# Patient Record
Sex: FEMALE | Race: BLACK OR AFRICAN AMERICAN | NOT HISPANIC OR LATINO | Employment: FULL TIME | URBAN - METROPOLITAN AREA
[De-identification: names, ages, dates, MRNs, and addresses within clinical notes are randomized per-mention and may not be internally consistent; named-entity substitution may affect disease eponyms.]

---

## 2017-11-01 ENCOUNTER — HISTORICAL (OUTPATIENT)
Dept: ADMINISTRATIVE | Facility: HOSPITAL | Age: 22
End: 2017-11-01

## 2017-11-01 LAB
ABS NEUT (OLG): 8.59 X10(3)/MCL (ref 2.1–9.2)
BASOPHILS # BLD AUTO: 0.06 X10(3)/MCL
BASOPHILS NFR BLD AUTO: 0 % (ref 0–1)
BILIRUB SERPL-MCNC: NEGATIVE MG/DL
BLOOD URINE, POC: NORMAL
BUN SERPL-MCNC: 7 MG/DL (ref 7–18)
CALCIUM SERPL-MCNC: 9.6 MG/DL (ref 8.5–10.1)
CHLORIDE SERPL-SCNC: 102 MMOL/L (ref 98–107)
CLARITY, POC UA: NORMAL
CO2 SERPL-SCNC: 26 MMOL/L (ref 21–32)
COLOR, POC UA: NORMAL
CREAT SERPL-MCNC: 0.7 MG/DL (ref 0.6–1.3)
EOSINOPHIL # BLD AUTO: 0.18 X10(3)/MCL
EOSINOPHIL NFR BLD AUTO: 2 % (ref 0–5)
ERYTHROCYTE [DISTWIDTH] IN BLOOD BY AUTOMATED COUNT: 12.6 % (ref 11.5–14.5)
GLUCOSE SERPL-MCNC: 79 MG/DL (ref 74–106)
GLUCOSE UR QL STRIP: NEGATIVE
HBV SURFACE AG SERPL QL IA: NEGATIVE
HCT VFR BLD AUTO: 34.9 % (ref 35–46)
HCV AB SERPL QL IA: NONREACTIVE
HGB BLD-MCNC: 12.4 GM/DL (ref 12–16)
HIV 1+2 AB+HIV1 P24 AG SERPL QL IA: NONREACTIVE
IMM GRANULOCYTES # BLD AUTO: 0.03 10*3/UL
IMM GRANULOCYTES NFR BLD AUTO: 0 %
LEUKOCYTE EST, POC UA: NORMAL
LYMPHOCYTES # BLD AUTO: 1.85 X10(3)/MCL
LYMPHOCYTES NFR BLD AUTO: 16 % (ref 15–40)
MCH RBC QN AUTO: 30 PG (ref 26–34)
MCHC RBC AUTO-ENTMCNC: 35.5 GM/DL (ref 31–37)
MCV RBC AUTO: 84.3 FL (ref 80–100)
MONOCYTES # BLD AUTO: 0.78 X10(3)/MCL
MONOCYTES NFR BLD AUTO: 7 % (ref 4–12)
NEUTROPHILS # BLD AUTO: 8.59 X10(3)/MCL
NEUTROPHILS NFR BLD AUTO: 75 X10(3)/MCL
NITRITE, POC UA: NEGATIVE
PH, POC UA: 5
PLATELET # BLD AUTO: 273 X10(3)/MCL (ref 130–400)
PMV BLD AUTO: 11.3 FL (ref 7.4–10.4)
POTASSIUM SERPL-SCNC: 3.9 MMOL/L (ref 3.5–5.1)
PROTEIN, POC: NORMAL
RBC # BLD AUTO: 4.14 X10(6)/MCL (ref 4–5.2)
SODIUM SERPL-SCNC: 137 MMOL/L (ref 136–145)
SPECIFIC GRAVITY, POC UA: 1.01
T PALLIDUM AB SER QL: NONREACTIVE
UROBILINOGEN, POC UA: NORMAL
WBC # SPEC AUTO: 11.5 X10(3)/MCL (ref 4.5–11)

## 2017-11-03 LAB — FINAL CULTURE: NO GROWTH

## 2017-11-29 LAB
BILIRUB SERPL-MCNC: NEGATIVE MG/DL
BLOOD URINE, POC: NORMAL
CLARITY, POC UA: NORMAL
COLOR, POC UA: NORMAL
GLUCOSE UR QL STRIP: NEGATIVE
KETONES UR QL STRIP: NORMAL
LEUKOCYTE EST, POC UA: NORMAL
NITRITE, POC UA: NEGATIVE
PH, POC UA: 6
PROTEIN, POC: NORMAL
SPECIFIC GRAVITY, POC UA: 1.02
UROBILINOGEN, POC UA: NORMAL

## 2018-02-19 LAB
BILIRUB SERPL-MCNC: NEGATIVE MG/DL
BLOOD URINE, POC: NEGATIVE
CLARITY, POC UA: CLEAR
COLOR, POC UA: YELLOW
GLUCOSE UR QL STRIP: NEGATIVE
KETONES UR QL STRIP: NEGATIVE
LEUKOCYTE EST, POC UA: NORMAL
NITRITE, POC UA: NEGATIVE
PH, POC UA: 6
PROTEIN, POC: NEGATIVE
SPECIFIC GRAVITY, POC UA: 1.01
UROBILINOGEN, POC UA: NORMAL

## 2018-03-23 ENCOUNTER — HISTORICAL (OUTPATIENT)
Dept: ADMINISTRATIVE | Facility: HOSPITAL | Age: 23
End: 2018-03-23

## 2018-03-23 LAB
BILIRUB SERPL-MCNC: NEGATIVE MG/DL
BLOOD URINE, POC: NEGATIVE
CLARITY, POC UA: CLEAR
COLOR, POC UA: YELLOW
ERYTHROCYTE [DISTWIDTH] IN BLOOD BY AUTOMATED COUNT: 12.5 % (ref 11.5–14.5)
GLUCOSE 1H P 100 G GLC PO SERPL-MCNC: 67 MG/DL
GLUCOSE UR QL STRIP: NEGATIVE
HCT VFR BLD AUTO: 32 % (ref 35–46)
HGB BLD-MCNC: 10.8 GM/DL (ref 12–16)
KETONES UR QL STRIP: NEGATIVE
LEUKOCYTE EST, POC UA: NEGATIVE
MCH RBC QN AUTO: 30 PG (ref 26–34)
MCHC RBC AUTO-ENTMCNC: 33.8 GM/DL (ref 31–37)
MCV RBC AUTO: 88.9 FL (ref 80–100)
NITRITE, POC UA: NEGATIVE
PH, POC UA: 6
PLATELET # BLD AUTO: 236 X10(3)/MCL (ref 130–400)
PMV BLD AUTO: 11.6 FL (ref 7.4–10.4)
PROTEIN, POC: NEGATIVE
RBC # BLD AUTO: 3.6 X10(6)/MCL (ref 4–5.2)
SPECIFIC GRAVITY, POC UA: 1.01
UROBILINOGEN, POC UA: NORMAL
WBC # SPEC AUTO: 12.5 X10(3)/MCL (ref 4.5–11)

## 2018-03-25 LAB — FINAL CULTURE: NO GROWTH

## 2018-04-20 LAB
BILIRUB SERPL-MCNC: NEGATIVE MG/DL
BLOOD URINE, POC: NEGATIVE
CLARITY, POC UA: CLEAR
COLOR, POC UA: YELLOW
GLUCOSE UR QL STRIP: NEGATIVE
KETONES UR QL STRIP: NEGATIVE
LEUKOCYTE EST, POC UA: NEGATIVE
NITRITE, POC UA: NEGATIVE
PH, POC UA: 6
PROTEIN, POC: NEGATIVE
SPECIFIC GRAVITY, POC UA: 1.01
UROBILINOGEN, POC UA: NORMAL

## 2018-05-10 ENCOUNTER — HISTORICAL (OUTPATIENT)
Dept: ADMINISTRATIVE | Facility: HOSPITAL | Age: 23
End: 2018-05-10

## 2018-05-10 LAB
BILIRUB SERPL-MCNC: NEGATIVE MG/DL
BLOOD URINE, POC: NEGATIVE
CLARITY, POC UA: CLEAR
COLOR, POC UA: YELLOW
GLUCOSE UR QL STRIP: NEGATIVE
KETONES UR QL STRIP: NEGATIVE
LEUKOCYTE EST, POC UA: NORMAL
NITRITE, POC UA: NEGATIVE
PH, POC UA: 7
PROTEIN, POC: NORMAL
SPECIFIC GRAVITY, POC UA: 1
UROBILINOGEN, POC UA: NORMAL

## 2018-05-12 LAB — FINAL CULTURE: NORMAL

## 2018-05-18 LAB
BILIRUB SERPL-MCNC: NEGATIVE MG/DL
BLOOD URINE, POC: NEGATIVE
CLARITY, POC UA: NORMAL
COLOR, POC UA: NORMAL
GLUCOSE UR QL STRIP: NEGATIVE
KETONES UR QL STRIP: NORMAL
LEUKOCYTE EST, POC UA: NORMAL
NITRITE, POC UA: NEGATIVE
PH, POC UA: 5
PROTEIN, POC: NORMAL
SPECIFIC GRAVITY, POC UA: 1.01
UROBILINOGEN, POC UA: NORMAL

## 2018-05-22 ENCOUNTER — HISTORICAL (OUTPATIENT)
Dept: LAB | Facility: HOSPITAL | Age: 23
End: 2018-05-22

## 2018-12-03 LAB — POC BETA-HCG (QUAL): NEGATIVE

## 2019-03-27 ENCOUNTER — HISTORICAL (OUTPATIENT)
Dept: ADMINISTRATIVE | Facility: HOSPITAL | Age: 24
End: 2019-03-27

## 2019-03-27 LAB
ALBUMIN SERPL-MCNC: 4 GM/DL (ref 3.4–5)
ALBUMIN/GLOB SERPL: 1 RATIO (ref 1.1–2)
ALP SERPL-CCNC: 89 UNIT/L (ref 45–117)
ALT SERPL-CCNC: 15 UNIT/L (ref 12–78)
AST SERPL-CCNC: 15 UNIT/L (ref 15–37)
BILIRUB SERPL-MCNC: 0.3 MG/DL (ref 0.2–1)
BILIRUBIN DIRECT+TOT PNL SERPL-MCNC: <0.1 MG/DL
BILIRUBIN DIRECT+TOT PNL SERPL-MCNC: ABNORMAL MG/DL
BUN SERPL-MCNC: 13 MG/DL (ref 7–18)
CALCIUM SERPL-MCNC: 8.9 MG/DL (ref 8.5–10.1)
CHLORIDE SERPL-SCNC: 108 MMOL/L (ref 98–107)
CO2 SERPL-SCNC: 26 MMOL/L (ref 21–32)
CREAT SERPL-MCNC: 0.7 MG/DL (ref 0.6–1.3)
ERYTHROCYTE [DISTWIDTH] IN BLOOD BY AUTOMATED COUNT: 13.2 % (ref 11.5–14.5)
GLOBULIN SER-MCNC: 4.1 GM/ML (ref 2.3–3.5)
GLUCOSE SERPL-MCNC: 74 MG/DL (ref 74–106)
HCT VFR BLD AUTO: 39.7 % (ref 35–46)
HGB BLD-MCNC: 12.8 GM/DL (ref 12–16)
MCH RBC QN AUTO: 28.1 PG (ref 26–34)
MCHC RBC AUTO-ENTMCNC: 32.2 GM/DL (ref 31–37)
MCV RBC AUTO: 87.3 FL (ref 80–100)
PLATELET # BLD AUTO: 251 X10(3)/MCL (ref 130–400)
PMV BLD AUTO: 12.1 FL (ref 7.4–10.4)
POTASSIUM SERPL-SCNC: 3.9 MMOL/L (ref 3.5–5.1)
PROT SERPL-MCNC: 8.1 GM/DL (ref 6.4–8.2)
RBC # BLD AUTO: 4.55 X10(6)/MCL (ref 4–5.2)
SODIUM SERPL-SCNC: 142 MMOL/L (ref 136–145)
WBC # SPEC AUTO: 7.2 X10(3)/MCL (ref 4.5–11)

## 2019-04-03 ENCOUNTER — HISTORICAL (OUTPATIENT)
Dept: SURGERY | Facility: HOSPITAL | Age: 24
End: 2019-04-03

## 2019-04-03 LAB — B-HCG SERPL QL: NEGATIVE

## 2022-04-10 ENCOUNTER — HISTORICAL (OUTPATIENT)
Dept: ADMINISTRATIVE | Facility: HOSPITAL | Age: 27
End: 2022-04-10
Payer: MEDICAID

## 2022-04-25 VITALS
WEIGHT: 134.5 LBS | HEIGHT: 62 IN | OXYGEN SATURATION: 99 % | SYSTOLIC BLOOD PRESSURE: 118 MMHG | DIASTOLIC BLOOD PRESSURE: 83 MMHG | BODY MASS INDEX: 24.75 KG/M2

## 2022-04-30 NOTE — PROGRESS NOTES
Health Maintenance     Pending (in the next year)        Due            Influenza Vaccine due  10/02/17  and every 1  year(s)           Alcohol Misuse Screening due  11/01/17  and every 1  year(s)           Depression Screening due  11/01/17  and every 1  year(s)           Tetanus Vaccine due  11/01/17  and every 10  year(s)     Satisfied (in the past 1 year)        Satisfied            Blood Pressure Screening on  11/01/17.  Satisfied by Tamia Mo RN           Body Mass Index Check on  11/01/17.  Satisfied by Tamia Mo RN           Obesity Screening on  11/01/17.  Satisfied by Tamia Mo RN

## 2022-04-30 NOTE — OP NOTE
DATE OF SURGERY:    1/29/18    SURGEON:  Francisco Santillan MD,      FIRST ASSIST: Danna Cheney MD    PREOPERATIVE DIAGNOSIS:  Bilateral Macromastia.    POSTOPERATIVE DIAGNOSIS:  Bilateral Macromastia.    PROCEDURE:  Bilateral breast reduction.    INDICATIONS FOR PROCEDURE:  This is a 23 year old female with a history of very large breasts.  These are extremely heavy and causing recurrent rashes and bra grooving as well as intertrigo.  She has had large breasts her whole life and this is inhibiting her quality of life and causing severe disability.  She presents for bilateral breast reduction.    ANESTHESIA:  General.    COMPLICATIONS:  None.    EBL: 100cc    SPECIMENS:  Right breast 422grams.  Left breast 460grams.    PROCEDURE IN DETAIL:  The patient was endotracheally intubated and prepped and draped in the usual sterile fashion.  We first turned our attention to the right breast.  We first marked out a 10 cm pedicle using a ruler and a marking pen.  This was an inferior type pedicle.  We then placed a breast tourniquet.  We used a 42 mm cookie cutter to outline an areolar incision.  Using a 10 blade scalpel we made an incision around the areolar and outlined the 10 cm pedicle.  The entire inferior pedicle was de-epithelialized using a 10 blade scalpel.  The breast tourniquet was then released and de-epithelization was completed using sharp curved Medina scissors.  We then used a 10 blade scalpel to outline the Cadena pattern skin resection.  We raised a superior flap using the Bovie cautery down to the level of the pectoralis major muscle.  A pocket was then created using the Bovie cautery staying superior to the pectoralis major all the way up to the clavicle.  After the superior flap was elevated we then performed our resection first medially and then centrally and then laterally using Bovie cautery.  The breast tissue was removed in its entirety and continuity.  After the breast was removed this was weighed.   This was 422grams.  After this was completed the entire operative bed was irrigated out with sterile saline solution.  Hemostasis was achieved using the Bovie cautery.  The inferior pedicle was the plicated using interrupted 0 Vicryl sutures.  After this was completed we then closed the superior flap over the inferior pedicle, closing the entirety of the Cadena pattern using a combination of Adson's and staplers.  This size and shape was excellent.  We then turned our attention to the left.      In a similar fashion an inferior pedicle was outlined which was 10 cm and completely de-epithelialized using a 10 blade scalpel after a 42 mm cookie cutter was used to cut out the nipple.  After this was completed we then raised superior flaps in an identical fashion first incising the skin using a 10 blade scalpel and raising the flap using Bovie cautery down to pectoralis major muscle.  A pocket was then created for the inferior pedicle.  We then removed the medial, superior, and lateral segments.  Total weight was 460grams.  After this was completed the entirety of the operative bed was irrigated out with sterile saline solution and Bovie cautery was used to obtain hemostasis.  Superior flaps were then closed over the inferior pedicle after the pedicle was plicated using interrupted 0 Vicryl sutures.  The Cadena pattern was closed using staples.  We then began to close.  We closed the deep tissues using interrupted 0 Vicryl pops.  We then closed the skin using a running 4-0 monofilament suture.  We then sat the patient up in a 90 degree position.  We marked out the nipples using 42 mm cookie cutters.  The nipples were cut out and brought up through superior flaps and the deep tissues were sewn into place using interrupted 3-0 Vicryl pops.  We then closed the areola using a running 4-0 monofilament suture.  The patient was then placed in a sterile dressing consisting of fluff and a surgical bra.  There were no complications.   I was scrubbed and present for the entire procedure.

## 2022-04-30 NOTE — PROGRESS NOTES
Patient:   Quinten Freitas            MRN: [id number]            FIN: [id number]               Age:   22 years     Sex:  Female     :  1995   Associated Diagnoses:   IUP (intrauterine pregnancy), incidental; Supervision of normal pregnancy in first trimester; GBS bacteriuria   Author:   Khanh Duron MD      Chief Complaint   23 yo  @ 8wk6d dated by 8wd U/S only (EDC 17)  10/4/17 early ultrasound in ED for pelvic pain showed likely gest sac; UPT ppos at that time      History of Present Illness     New Issues:  pink tinged d/c  intermittently <9 wks GA, no bleeding currently    Chronic Issues: Frequent UTIs      Histories   Gestational History:   - G1: Current   Gyn History:    - LMP: 17   - Age at menarche: 17 years   - Menstrual hx: regular, 3-5day cycles,    - History of birth control: N/A    - History of STDs and/or Abnormal PAPs: _    Past Medical History: Eczema  Surgical History: denies  Family History: denies thyroid disease in priomary relations  Social History: denies smoking, denies drug use, denies etoh  Medications:  PNvits      Past Medical History:    No active or resolved past medical history items have been selected or recorded.   Family History:    Entire family history is negative.   Procedure history:    No active procedure history items have been selected or recorded.   Social History        Social & Psychosocial Habits    Alcohol  2012 Risk Assessment: Denies Alcohol Use    2017  Use: Never    Employment/School  10/31/2017  Status: Employed, Student    Substance Abuse  2017  Use: Never    Tobacco  2012 Risk Assessment: Denies Tobacco Use    2017  Use: Never smoker  .        Health Status   Allergies:    Allergic Reactions (All)  No Known Allergies,    Allergies (1) Active Reaction  No Known Allergies None Documented     Current medications:  (Selected)   Prescriptions  Prescribed  Prenatal Plus oral tablet: 1 tab(s), Oral,  Daily, # 30 tab(s), 0 Refill(s), [id number]  doxylamine 25 mg oral tablet: 12.5 mg = 0.5 tab(s), Oral, TIDAC, 15 to 30 minutes before bed, X 30 day(s), # 45 tab(s), 1 Refill(s), [id number]  pyridoxine 25 mg oral tablet: 25 mg = 1 tab(s), Oral, TIDAC, X 30 day(s), # 90 tab(s), 2 Refill(s), [id number],    Home Medications (3) Active  doxylamine 25 mg oral tablet 12.5 mg = 0.5 tab(s), Oral, TIDAC  Prenatal Plus oral tablet 1 tab(s), Oral, Daily  pyridoxine 25 mg oral tablet 25 mg = 1 tab(s), Oral, TIDAC     Problem list:    All Problems  Encounter to determine fetal viability of pregnancy / SNOMED CT 58580060 / Confirmed  Hyperemesis gravidarum / SNOMED CT 98028709 / Confirmed  Problem added by Discern Expert  Pregnant / SNOMED CT 597392273 / Confirmed  Urinary tract infection in pregnancy / SNOMED CT 042280775 / Confirmed  Problem added by Discern Expert  Canceled: No Chronic Problems / Cerner NKP,    Active Problems (4)  Encounter to determine fetal viability of pregnancy   Hyperemesis gravidarum   Pregnant   Urinary tract infection in pregnancy         Review of Systems     Antepartum specific     denies: dysuira/ abnormal discharge /vag bleeding/ pelvic or abd pain /   reports: nause and vomiting , mild HA intermittently      Constitutional:  No fever, No chills.    Ear/Nose/Mouth/Throat:  Negative except as documented in history of present illness.    Respiratory:  No shortness of breath, No cough, No wheezing.    Cardiovascular:  No chest pain, No palpitations.    Gastrointestinal:  No nausea, No vomiting.    Genitourinary:  No dysuria, No lesions.    Gynecologic:  Negative except as documented in history of present illness.    Musculoskeletal:  No back pain, No joint pain, No muscle pain.    Integumentary:  No rash, No skin lesion.    Psychiatric:  No anxiety, No depression, Not suicidal.       Physical Examination   Vital Signs   11/1/2017 8:41 CDT        Temperature Oral          36.9 DegC                             Peripheral Pulse Rate     74 bpm                             Respiratory Rate          18 br/min                             SpO2                      100 %                             Saturation Probe Site     Hand, left                             Systolic Blood Pressure   111 mmHg                             Diastolic Blood Pressure  74 mmHg                             Mean Arterial Pressure, Cuff              86 mmHg                             Blood Pressure Location   Right arm                             Blood Pressure Cuff Size  Adult     Measurements from flowsheet : Measurements   11/1/2017 8:41 CDT       Weight Measured           58.05 kg                             Weighing Method           Standing                             Height/Length Dosing      157 cm                             Height/Length Measured    157 cm                             BSA Measured              1.59 m2                             Body Mass Index Measured  23.55 kg/m2                             Ideal Body Weight Calculated              49.665 kg     General:  Alert and oriented, No acute distress.    Eye:  Extraocular movements are intact, Normal conjunctiva.    HENT:  Normocephalic, Oral mucosa is moist.    Neck:  No thyromegaly.    Respiratory:  Lungs are clear to auscultation, Respirations are non-labored, Breath sounds are equal.    Cardiovascular:  Normal rate, Regular rhythm, Normal peripheral perfusion.    Gastrointestinal:  Soft, Non-tender, Normal bowel sounds, gravid.    Genitourinary:          Labia: no lesions.         Cervix: Mucosa ( No inflammation ), no blood per os, No lesions.    Obstetric Exam     Vagina: discharge (small amount, white), no lesions.     Musculoskeletal:  Normal strength, Normal gait.    Integumentary:  Warm, Dry, No rash.    Neurologic:  Alert, Normal deep tendon reflexes.    Cognition and Speech:  Oriented, Speech clear and  coherent.    Psychiatric:  Cooperative, Appropriate mood & affect, Normal judgment, Non-suicidal.       Health Maintenance      Health Maintenance     Pending (in the next year)        Due            Influenza Vaccine due  10/02/17  and every 1  year(s)           Alcohol Misuse Screening due  11/01/17  and every 1  year(s)           Depression Screening due  11/01/17  and every 1  year(s)           Tetanus Vaccine due  11/01/17  and every 10  year(s)     Satisfied (in the past 1 year)        Satisfied            Blood Pressure Screening on  11/01/17.  Satisfied by Tamia Mo RN.           Body Mass Index Check on  11/01/17.  Satisfied by Tamia Mo RN           Obesity Screening on  11/01/17.  Satisfied by Tamia Mo RN          Review / Management   Results review:  Lab results   11/1/2017 8:50 CDT       Urine Color Urine Dipstick                Dark yellow                             Urine Appearance Urine Dipstick           Slightly cloudy                             pH Urine Dipstick         5                             Specific Gravity Urine Dipstick           1.015                             Blood Urine Dipstick      Large                             Glucose Urine Dipstick    Negative                             Protein Urine Dipstick    Trace                             Bilirubin Urine Dipstick  Negative                             Urobilinogen Urine Dipstick               0.2 mg/dl                             Leukocytes Urine Dipstick Small                             Nitrite Urine Dipstick    Negative  .    Laboratory Results     Initial OB Labs(11/1/17): PENDING   - Blood Type and Rh: _   - Antibody Screen: _   - CBC H/H: _   - HIV: _   - RPR: _   - GC: _   - CT: _   - HBsAg: _   - HCVAb: _   - Rubella: _   - Varicella: _   - UA & Culture: _   - Sickle Cell Screen: _   - PAP: N/A (21 yo)     Influenza vaccine date:  NEEDS    15-20 Weeks Lab    - Quad Screen: _    28 Week Lab    - 1H  GTT: _    - Rhogam: _    - Date of Tdap: _    - CBC H/H: _    - RPR: _    36 Week Lab    - CBC H/H: _    - RPR: _    - GBS Culture: _    - HIV: _    - Urine: GC: _      Radiology results   Ultrasound(s):   - Initial U/S: 11/1/17: Comments: 1st trimester intrauterine pregnancy measuring CRL: 2.21cm; EGA: 8w6d; and EDC of 06/07/2018. (differs from LMP based EDC by 7 days)  FHR: 180. Viable pregnancy. ASSIGN EDC: 06/07/20 by this 8wk6d U/S ONLY            - 20wk Anatomy scan:      Documentation reviewed:          -POC Urine Dip: reviewed at time of interview .       Impression and Plan   Diagnosis     IUP (intrauterine pregnancy), incidental (RMU17-WQ Z33.1).     Supervision of normal pregnancy in first trimester (UQK49-GL Z34.91).     IUP (intrauterine pregnancy), incidental (JBY24-CI Z33.1).     GBS bacteriuria (RCI58-KG R82.71).     Orders     Orders (Selected)   Outpatient Orders  Ordered  [id number] U/S <14wk Sngl Fetus PC: 11/01/17 9:04:00 CDT  Routine Spec Collect Venipuncture - Lab blood collect: 11/01/17 11:06:00 CDT, [facility], Routine, 11/01/17 11:06:00 CDT  Wet Prep POC: 11/01/17 10:48:00 CDT, [facility]  Ordered (Collected)  Varicella Zoster Virus IgG Ab-[facility] [id number]: Routine collect, Blood, 11/01/17 11:06:00 CDT, Stop date 11/01/17 11:06:00 CDT, Nurse collect, Encounter to determine fetal viability of pregnancy  Encounter for supervision of normal first pregnancy, 11/01/17 11:06:00 CDT  Ordered (In-Lab)  ABSC Pre: Routine collect, 11/01/17 11:06:00 CDT, Blood, Stop date 11/01/17 11:06:00 CDT, Lab Collect, Encounter to determine fetal viability of pregnancy  Encounter for supervision of normal first pregnancy, 11/01/17 11:06:00 CDT  Automated Diff: Routine collect, 11/01/17 11:00:00 CDT, Blood, Collected, Stop date 11/01/17 11:00:00 CDT, Lab Collect, Encounter to determine fetal viability of pregnancy  Encounter for supervision of normal first pregnancy, 11/01/17 11:06:00 CDT  BMP: Routine collect,  11/01/17 11:06:00 CDT, Blood, Stop date 11/01/17 11:06:00 CDT, Lab Collect, Encounter to determine fetal viability of pregnancy  Encounter for supervision of normal first pregnancy, 11/01/17 11:06:00 CDT  CBC w/ Auto Diff: Routine collect, 11/01/17 11:06:00 CDT, Blood, Stop date 11/01/17 11:06:00 CDT, Lab Collect, Encounter to determine fetal viability of pregnancy  Encounter for supervision of normal first pregnancy, 11/01/17 11:06:00 CDT  Culture Urine: Routine collect, 11/01/17 11:06:00 CDT, Urine, Clean Catch, Nurse collect, Stop date 11/01/17 11:06:00 CDT, Encounter to determine fetal viability of pregnancy  Encounter for supervision of normal first pregnancy  HIV 1 and 2: Routine collect, 11/01/17 11:06:00 CDT, Blood, Stop date 11/01/17 11:06:00 CDT, Lab Collect, Encounter to determine fetal viability of pregnancy  Encounter for supervision of normal first pregnancy, 11/01/17 11:06:00 CDT  Hepatitis B Surface Antigen: Routine collect, 11/01/17 11:06:00 CDT, Blood, Stop date 11/01/17 11:06:00 CDT, Lab Collect, Encounter to determine fetal viability of pregnancy  Encounter for supervision of normal first pregnancy, 11/01/17 11:06:00 CDT  Hepatitis C Antibody: Routine collect, 11/01/17 11:06:00 CDT, Blood, Stop date 11/01/17 11:06:00 CDT, Lab Collect, Encounter to determine fetal viability of pregnancy  Encounter for supervision of normal first pregnancy, 11/01/17 11:06:00 CDT  Rubella Antibody IgG: Routine collect, 11/01/17 11:06:00 CDT, Blood, Stop date 11/01/17 11:06:00 CDT, Lab Collect, Encounter to determine fetal viability of pregnancy  Encounter for supervision of normal first pregnancy, 11/01/17 11:06:00 CDT  Sickle Cell Solubility Test: Routine collect, 11/01/17 11:06:00 CDT, Blood, Stop date 11/01/17 11:06:00 CDT, Lab Collect, Encounter to determine fetal viability of pregnancy  Encounter for supervision of normal first pregnancy, 11/01/17 11:06:00 CDT  Syphilis Antibody (with Reflex RPR):  Routine collect, 17 11:06:00 CDT, Blood, Once, Stop date 17 11:06:00 CDT, Lab Collect, Encounter to determine fetal viability of pregnancy  Encounter for supervision of normal first pregnancy, 17 11:06:00 CDT  Type and Rh Pre -auto: Routine collect, 17 11:06:00 CDT, Blood, Stop date 17 11:06:00 CDT, Lab Collect, Encounter to determine fetal viability of pregnancy  Encounter for supervision of normal first pregnancy, 17 11:06:00 CDT  Type and Rh: Routine collect, 17 11:06:00 CDT, Blood, Stop date 17 11:06:00 CDT, Lab Collect, Encounter to determine fetal viability of pregnancy  Encounter for supervision of normal first pregnancy, 17 11:06:00 CDT  [facility] Chlamydia trach & Neisseria gonorrhea PCR(urn & cerv only): Routine collect, Cervical, 17 11:06:00 CDT, Stop date 17 11:06:00 CDT, Nurse collect, Supervision of normal pregnancy in first trimester  Encounter to determine fetal viability of pregnancy  Future (On Hold)  Clinic Follow up: *Est. 17 3:00:00 CST, RTC 4 weeks assign to Continuity Resident, Order for future visit, Encounter to determine fetal viability of pregnancy  Encounter for supervision of normal first pregnancy, [facility]  Prescriptions  Prescribed  Prenatal Plus oral tablet: 1 tab(s), Oral, Daily, # 90 tab(s), 11 Refill(s), Pharmacy: [id number]  doxylamine 25 mg oral tablet: 12.5 mg = 0.5 tab(s), Oral, TIDAC, 15 to 30 minutes before bed, X 30 day(s), # 45 tab(s), 1 Refill(s), Pharmacy: [facility] #[id number]  pyridoxine 25 mg oral tablet: 25 mg = 1 tab(s), Oral, TIDAC, X 30 day(s), # 90 tab(s), 2 Refill(s), Pharmacy: [id number].       21 yo  @  dated by  U/S only (EDC 17)       1. Intrauterine Pregnancy    - OB Protocol     - PNVs perscribed 17    - 17 Urine dip: sm LE/ neg nitr/ tr prot     - f/u 17 UCx PENDING and document in Prenatal labs    - f/u  17(initial) labs: ordered/PENDING    - Mother plans on breast and/or bottlefeeds    - pregnancy diet / safe meds in pregnancy discussed    - precautioned regarding compliciations of first trimester    - Assign to PCP; RTC in 4wks    2. Nausea and vomitign complicating pregnancy  - fluid status normal, VSS stable  - B6 + Pyridoxine pescribed TID AC (17); Escribed  - strict ED precautions given for : uncontrolled nausea or vomiting/ dehydration/ unable to tolerate po    3. GBS carrier  - 17 2wk GA EmergDept UCx grew GBS in setting of 3 species  bactiuria (possible contam), No treatment per ED note  - F/u  UCx    DISPO: assigned to low risk PCP in 4 weeks   -NEEDS flu vacc   - f/u N/ Vom and complicance with B6+ Doxylamine   - f/u 17(initial) labs: ordered/PENDING   -anticipate  as birth plan currently      Rhonda NOVA

## 2022-04-30 NOTE — PROGRESS NOTES
Patient:   Quinten Freitas            MRN: [id number]            FIN: [id number]               Age:   22 years     Sex:  Female     :  1995   Associated Diagnoses:   IUP (intrauterine pregnancy), incidental   Author:   Indigo Ansari MD      History of Present Illness   22 year old  @ 33^1 WGA dated by 8wk6d U/S only (EDC 17) presents for OB follow up.    New Issues: No acute complaints for today.  Denies urinary urgency/frequency/dysuria.  Denies headache, vision change, gush of fluid, vaginal bleeding.    Chronic Issues: Sickle Cell Trait      Review of Systems   Contractions: [place]  Vaginal bleeding: No  Gush of fluid:No  Feels maybe move: Yes  Headache/vision change: No           Health Status   Allergies:    Allergies (1) Active Reaction  No Known Allergies None Documented     Current medications:    Home Medications (2) Active  Prenatal Multivitamin/Folic Acid 0.4 mg oral tablet ([facility] Substitution) 1 tab(s), Oral, Daily  Prenatal Multivitamin/Folic Acid 0.4 mg oral tablet ([facility] Substitution) 1 tab(s), Oral, Daily        Physical Examination   Vital Signs   2018 12:56 CDT      Temperature Oral          36.9 DegC                             Temperature Oral (calculated)             98.42 DegF                             Peripheral Pulse Rate     103 bpm  HI                             Respiratory Rate          18 br/min                             SpO2                      99 %                             Saturation Probe Site     Hand, right                             Systolic Blood Pressure   100 mmHg                             Diastolic Blood Pressure  67 mmHg                             Mean Arterial Pressure, Cuff              78 mmHg                             Blood Pressure Location   Right arm                             Blood Pressure Cuff Size  Adult     Measurements from flowsheet : Measurements   2018 12:56 CDT      Weight Measured           62.90 kg                              Weighing Method           Standing                             Height/Length Dosing      157 cm                             Height/Length Measured    157 cm                             BSA Measured              1.66 m2                             Body Mass Index Measured  25.52 kg/m2                             Ideal Body Weight Calculated              49.210108 kg     General:  Alert and oriented, No acute distress.    Eye:  Extraocular movements are intact, Normal conjunctiva.    HENT:  Oral mucosa is moist, No pharyngeal erythema.    Respiratory:  Lungs are clear to auscultation, Respirations are non-labored, Breath sounds are equal.    Cardiovascular:  Normal rate, Regular rhythm, No murmur.    Gastrointestinal:  Soft, Non-tender, Normal bowel sounds, gravid.    Genitourinary:  No costovertebral angle tenderness.    Obstetric Exam     Uterus: consistent with gestational age.     [place]/ Baby A fetal evaluation: heart tones (148 bpm, FH at 33cm).     Neurologic:  Alert, Oriented.    Psychiatric:  Cooperative, Appropriate mood & affect, Normal judgment, Non-suicidal.       Review / Management   Initial OB Labs:   - Blood Type and Rh: O POS   - Antibody Screen: negative   - CBC H/H: 12.1/34.6   - HIV: negative   - RPR: negative   - GC: negative   - CT: negative   - HBsAg: negative   - HCVAb: negative   - Rubella: immune   - Varicella: immune      - Sickle cell screen: positive (hx of sickle cell trait)   - UA & Culture: no final growth   - PAP: N/A (21 yo)     Influenza vaccine date: refuses, patient states she tested + for the flu at 22 WGA    15-20 Weeks Lab    - Quad Screen: missed    28 Week Lab    - 1H GTT: 67    - Rhogam: O+, not indicated    - Date of Tdap: 4/20/18    - CBC H/H: 10.8/32.0        36 Week Lab    - CBC H/H: _    - RPR: _    - GBS Culture: _    - HIV: _    - Urine: GC: _      Radiology results   Ultrasound(s):   - Initial U/S: 11/1/17: Comments: 1st trimester intrauterine  pregnancy measuring CRL: 2.21cm; EGA: 8w6d; and EDC of 2018. (differs from LMP based EDC by 7 days)  FHR: 180. Viable pregnancy. ASSIGN EDC: 20 by this 8wk6d U/S ONLY     - 20wk Anatomy scan: Comments: 2nd trimester interuterine pregnancy measuring 25w3d for EDC  of 2018 consistent with assigned date of 2018. Ratio calculations   within range for symmetrical development. EFW is 749.17gm [1lb 12oz] appropriate for gestational age at 41.2% {Rich}. FHR: 145; Gender: Female. No fetal anomalies noted         Impression and Plan   Diagnosis     IUP (intrauterine pregnancy), incidental ([id number] Z33.1).         1. Intrauterine Pregnancy    - Continue PNVs    - Urine dip as above    - Mother plans on breast and bottlefeeding    - Labor precautions given in detail     2. Hx of Sickle Cell Trait   - urine culture Q trimester           Disposition:  Anticipated

## 2022-04-30 NOTE — PROGRESS NOTES
Patient:   Quinten Freitas            MRN: [id number]            FIN: [id number]               Age:   22 years     Sex:  Female     :  1995   Associated Diagnoses:   None   Author:   Susanna NOVA, Luis KUMAR      Physician: SUSANNA  Reason for Exam:INITIAL PRENATAL VISIT; DATING US  :1  Parity:0  Gestational Age: 7w6d  EDC:     Examination:  IKE: ADEQUATE  Fetal Tone: PRESENT  Fetal Movement:   Fetal Heart Rate: 180  Fetus: SINGLE  Presentation:   Placenta:       Position:WRAP       Grade:     Measurement:     CRL: 2.21cm  EGA: 8w6d  EDC: 2018    Comments: 1st trimester intrauterine pregnancy measuring CRL: 2.21cm; EGA: 8w6d; and EDC of 2018.  FHR: 180. Viable pregnancy. ASSIGN EDC: 20          This document has images

## 2022-06-29 ENCOUNTER — LAB VISIT (OUTPATIENT)
Dept: LAB | Facility: HOSPITAL | Age: 27
End: 2022-06-29
Attending: OBSTETRICS & GYNECOLOGY
Payer: MEDICAID

## 2022-06-29 DIAGNOSIS — Z34.80 SUPERVISION OF OTHER NORMAL PREGNANCY: Primary | ICD-10-CM

## 2022-06-29 LAB
BASOPHILS # BLD AUTO: 0.03 X10(3)/MCL (ref 0–0.2)
BASOPHILS NFR BLD AUTO: 0.3 %
EOSINOPHIL # BLD AUTO: 0.08 X10(3)/MCL (ref 0–0.9)
EOSINOPHIL NFR BLD AUTO: 0.7 %
ERYTHROCYTE [DISTWIDTH] IN BLOOD BY AUTOMATED COUNT: 13.4 % (ref 11.5–17)
GLUCOSE 1H P 100 G GLC PO SERPL-MCNC: 130 MG/DL (ref 74–100)
GROUP & RH: NORMAL
HBV SURFACE AG SERPL QL IA: NONREACTIVE
HCT VFR BLD AUTO: 31.7 % (ref 37–47)
HCV AB SERPL QL IA: NONREACTIVE
HGB BLD-MCNC: 10.7 GM/DL (ref 12–16)
IMM GRANULOCYTES # BLD AUTO: 0.1 X10(3)/MCL (ref 0–0.02)
IMM GRANULOCYTES NFR BLD AUTO: 0.8 % (ref 0–0.43)
INDIRECT COOMBS GEL: NORMAL
LYMPHOCYTES # BLD AUTO: 1.51 X10(3)/MCL (ref 0.6–4.6)
LYMPHOCYTES NFR BLD AUTO: 12.6 %
MCH RBC QN AUTO: 28.5 PG (ref 27–31)
MCHC RBC AUTO-ENTMCNC: 33.8 MG/DL (ref 33–36)
MCV RBC AUTO: 84.5 FL (ref 80–94)
MONOCYTES # BLD AUTO: 0.54 X10(3)/MCL (ref 0.1–1.3)
MONOCYTES NFR BLD AUTO: 4.5 %
NEUTROPHILS # BLD AUTO: 9.7 X10(3)/MCL (ref 2.1–9.2)
NEUTROPHILS NFR BLD AUTO: 81.1 %
NRBC BLD AUTO-RTO: 0 %
PLATELET # BLD AUTO: 241 X10(3)/MCL (ref 130–400)
PMV BLD AUTO: 11.1 FL (ref 9.4–12.4)
RBC # BLD AUTO: 3.75 X10(6)/MCL (ref 4.2–5.4)
T PALLIDUM AB SER QL: NONREACTIVE
TSH SERPL-ACNC: 1.48 UIU/ML (ref 0.35–4.94)
WBC # SPEC AUTO: 12 X10(3)/MCL (ref 4.5–11.5)

## 2022-06-29 PROCEDURE — 85660 RBC SICKLE CELL TEST: CPT

## 2022-06-29 PROCEDURE — 36415 COLL VENOUS BLD VENIPUNCTURE: CPT

## 2022-06-29 PROCEDURE — 86803 HEPATITIS C AB TEST: CPT

## 2022-06-29 PROCEDURE — 82950 GLUCOSE TEST: CPT

## 2022-06-29 PROCEDURE — 87340 HEPATITIS B SURFACE AG IA: CPT

## 2022-06-29 PROCEDURE — 86850 RBC ANTIBODY SCREEN: CPT | Performed by: OBSTETRICS & GYNECOLOGY

## 2022-06-29 PROCEDURE — 87536 HIV-1 QUANT&REVRSE TRNSCRPJ: CPT

## 2022-06-29 PROCEDURE — 87088 URINE BACTERIA CULTURE: CPT

## 2022-06-29 PROCEDURE — 84443 ASSAY THYROID STIM HORMONE: CPT

## 2022-06-29 PROCEDURE — 85025 COMPLETE CBC W/AUTO DIFF WBC: CPT

## 2022-06-29 PROCEDURE — 86780 TREPONEMA PALLIDUM: CPT

## 2022-06-30 LAB — HGB S BLD QL SOLY: POSITIVE

## 2022-07-01 LAB
BACTERIA UR CULT: NORMAL
HIV 2 AB SERPLBLD QL IA.RAPID: NEGATIVE
HIV1 AB SERPLBLD QL IA.RAPID: NEGATIVE
HIV1 RNA # PLAS NAA DL=20: NORMAL COPIES/ML

## 2022-07-02 LAB
RUBV IGG SERPL IA-ACNC: 0.8
RUBV IGG SERPL QL IA: NORMAL

## 2022-08-02 ENCOUNTER — HOSPITAL ENCOUNTER (EMERGENCY)
Facility: HOSPITAL | Age: 27
Discharge: HOME | End: 2022-08-02
Payer: MEDICAID

## 2022-08-02 VITALS
DIASTOLIC BLOOD PRESSURE: 55 MMHG | HEART RATE: 119 BPM | SYSTOLIC BLOOD PRESSURE: 106 MMHG | WEIGHT: 157 LBS | OXYGEN SATURATION: 99 % | HEIGHT: 62 IN | TEMPERATURE: 98 F | BODY MASS INDEX: 28.89 KG/M2 | RESPIRATION RATE: 20 BRPM

## 2022-08-02 LAB
FLUAV AG UPPER RESP QL IA.RAPID: NOT DETECTED
FLUBV AG UPPER RESP QL IA.RAPID: NOT DETECTED
SARS-COV-2 RNA RESP QL NAA+PROBE: DETECTED

## 2022-08-02 PROCEDURE — 99284 EMERGENCY DEPT VISIT MOD MDM: CPT | Mod: 25

## 2022-08-02 PROCEDURE — 87636 SARSCOV2 & INF A&B AMP PRB: CPT | Performed by: SPECIALIST

## 2022-08-02 NOTE — DISCHARGE INSTRUCTIONS
Over the counter medications you can take for your symptoms include Delsym, Tylenol Cold and Flu, Benadryl, Tylenol, and Robitussin. Call primary OB for further questions about safe medications during pregnancy

## 2022-08-02 NOTE — ED PROVIDER NOTES
"       [facility] NOTE     Admit Date: 2022  [facility] Physician: Tristan Schwartz  Primary OBGYN: Dr. Jayesh France    Admit Diagnosis/Chief Complaint: Abdominal Pain, Nausea and Vomiting and Shortness of breath    Chief Complaint   Patient presents with    COVID-19 Concerns     IUP @ 32. 3 wks, c/o SOB, abdominal pain, nausea, dizziness, congestion, cough - recent covid exposure       Hospital Course:  Quinten Freitas is a 27 y.o.  at 32w3d presents complaining of 2 days of generalized aches and abdominal pain, nausea congestion and cough along with some shortness of breath.  Patient reports no fever at home.  She does report that she has been exposed to COVID-19 and did not receive the COVID vaccine.    This IUP is complicated by no complications reported.    Patient denies vaginal bleeding, leakage of fluid, contractions, headache, vision changes, RUQ pain, dysuria and fever.  Fetal Movement: normal.    Review of Systems    BP (!) 106/55   Pulse (!) 119   Temp 97.5 °F (36.4 °C) (Temporal)   Resp 20   Ht 5' 2" (1.575 m)   Wt 71.2 kg (157 lb)   LMP  (LMP Unknown)   SpO2 99%   Breastfeeding No   BMI 28.72 kg/m²   Temp:  [97.5 °F (36.4 °C)] 97.5 °F (36.4 °C)  Pulse:  [110-119] 119  Resp:  [20] 20  SpO2:  [99 %-100 %] 99 %  BP: ()/(55-61) 106/55    General: in no respiratory distress and acyanotic alert oriented times 3 afebrile  Cardiovascular: regular rate and rhythm no murmurs  Respiratory: clear to auscultation, no wheezes, rales or rhonchi, symmetric air entry  Abdominal: fundus soft, nontender 32 weeks size FHT present  Back: lumbar tenderness present CVA tenderness none  Extremeties no redness or tenderness in the calves or thighs    SSE:   SVE:  Deferred      FHT: Category 1  TOCO:  No contractions noted    Medical Decision Making:  COVID-19 and influenza test was performed.  COVID-19 test was positive.  The patient's oxygen saturation is 100%.  The patient was given instructions on necessary " isolation and follow-up.  Instructions for isolation precautions and mass precautions for any recent contacts were also discussed.  Patient was given instructions on symptomatic treatment and precautions of when to return to the hospital if worsening symptoms.    LABS:     Recent Results (from the past 24 hour(s))   COVID/FLU A&B PCR    Collection Time: 22 12:16 PM   Result Value Ref Range    Influenza A PCR Not Detected Not Detected    Influenza B PCR Not Detected Not Detected    SARS-CoV-2 PCR Detected (A) Not Detected     [unfilled]     Imaging Results    None          ASSESMENT: Quinten Freitas is a 27 y.o.   at 32w3d with symptoms occurring for COVID-19 infection.    Discharge Diagnosis:  Pregnancy 32 weeks.  COVID-19 infection.    Status:Stable    Disposition:  discharged to home    Medications:   Over-the-counter medications have been discussed for treatment of COVID symptoms..    Patient Instructions:   Current Discharge Medication List      CONTINUE these medications which have NOT CHANGED    Details   prenatal vit/iron fum/folic ac (PRENATAL 1+1 ORAL) Take by mouth.             - Pt was given routine pregnancy instructions including to return to triage if she had any vaginal bleeding (other than spotting for the next 48hrs), any loss of fluid like her water broke, decreased fetal movement, or contractions Q 5min lasting for 2 or more hours. Pt was also instructed to drink copious water.  She was instructed to return to the OB emergency department or follow-up with her primary OB in the case of extreme elevated temperatures or change in respiratory status.  Patient voiced understanding of all these instructions and was subsequently discharged home.    She will follow up with her primary OB.    No discharge procedures on file.    Tristan Schwartz MD  OB-GYN Hospitalist       Tristan Schwartz MD  [phone]

## 2022-08-02 NOTE — Clinical Note
"Quinten"Syeda Freitas was seen and treated in our emergency department on 8/2/2022.     COVID-19 is present in our communities across the state. There is limited testing for COVID at this time, so not all patients can be tested. In this situation, your employee meets the following criteria:    Quinten Freitas has met the criteria for COVID-19 testing and has a POSITIVE result. She can return to work once they are asymptomatic for 24 hours without the use of fever reducing medications AND at least five days from the first positive result. A mask is recommended for 5 days post quarantine.     If you have any questions or concerns, or if I can be of further assistance, please do not hesitate to contact me.    Sincerely,              RN"

## 2022-08-02 NOTE — ED TRIAGE NOTES
presents to to [facility] from ER. 32.3 wks, prev , c/o symptoms after recent covid exposure- producitve cough, sore throat, congestion, nausea, SOB, abdominal pain. EFM and TOCO applied to soft non tender abdomen, VS and maternal hx obtained. Pt denies any leaking of fluid, vaginal bleeding, ctx, reports positive fetal movement. MD notified of pt's arrival, maternal hx, chief complaints; orders for COVID swab- collected accordingly and sent to lab for testing. Will continue to monitor pt VS and baby.

## 2022-09-04 ENCOUNTER — HOSPITAL ENCOUNTER (EMERGENCY)
Facility: HOSPITAL | Age: 27
Discharge: HOME | End: 2022-09-04
Attending: OBSTETRICS & GYNECOLOGY
Payer: MEDICAID

## 2022-09-04 VITALS
TEMPERATURE: 99 F | OXYGEN SATURATION: 100 % | SYSTOLIC BLOOD PRESSURE: 122 MMHG | DIASTOLIC BLOOD PRESSURE: 72 MMHG | HEART RATE: 91 BPM

## 2022-09-04 PROBLEM — Z03.71 ENCOUNTER FOR SUSPECTED PROM, WITH RUPTURE OF MEMBRANES NOT FOUND: Status: ACTIVE | Noted: 2022-09-04

## 2022-09-04 LAB
CTP QC/QA: YES
RUPTURE OF MEMBRANE: NEGATIVE

## 2022-09-04 PROCEDURE — 99284 EMERGENCY DEPT VISIT MOD MDM: CPT | Mod: 25

## 2022-09-04 NOTE — Clinical Note
"Quinten"Syeda Freitas was seen and treated in our emergency department on 9/4/2022.  She may return to work on 09/06/2022.       If you have any questions or concerns, please don't hesitate to call.      Jadyn Kimbrough RN    "

## 2022-09-04 NOTE — ED PROVIDER NOTES
YARELY NOTE  [facility]     Admit Date: 2022  YARELY Physician: Sally Pak  Primary OBGYN: Dr. Jayesh France    Admit Diagnosis/Chief Complaint: Leakage of Fluid and Abdominal Pain  Discharge Diagnosis: Discomforts of Pregnancy and membranes not ruptured , dehydration    Chief Complaint   Patient presents with    Rupture of Membranes    Abdominal Pain     37.1 week iup  with c/o possible rom and abdominal cramping       Hospital Course:  Quinten Freitas is a 27 y.o.  at 37w1d presents complaining of LOF and abdominal pain.  Patient denies vaginal bleeding, headache, vision changes, RUQ pain, dysuria, fever, and nausea/vomiting.  Fetal Movement: normal.    /72   Pulse 91   Temp 98.5 °F (36.9 °C)   LMP  (LMP Unknown)   SpO2 100%   Breastfeeding No   Temp:  [98.5 °F (36.9 °C)] 98.5 °F (36.9 °C)  Pulse:  [91] 91  SpO2:  [100 %] 100 %  BP: (122)/(72) 122/72    General: in no apparent distress well developed and well nourished non-toxic in no respiratory distress and acyanotic alert oriented times 3 afebrile  Cardiovascular: regular rate and rhythm no murmurs  Respiratory: unlabored  Abdominal: soft, nontender, nondistended, no abnormal masses, no epigastric pain fundus soft, nontender 37 weeks size FHT present  Back: lumbar tenderness absent CVA tenderness none suprapubic tenderness absent  Extremeties no redness or tenderness in the calves or thighs no edema    SVE (PeriWATCH)  Dilation (cm): 4.5  Effacement (%): 65  Station: -1  Cervical Position: Posterior  Cervical Consistency: Soft  Examined by:: RASHIDA Nielson RN  Fernandes Score: 8  Simplified Fernandes Score: 6         EFM: Cat 1, 130 modBTV, +accel, no decel (reassuring, reactive)  TOCO: irritability      Medical Decision Making:    LABS:     Recent Results (from the past 24 hour(s))   POCT RUPTURE OF MEMBRANE    Collection Time: 22  6:54 PM   Result Value Ref Range    Rupture of Membrane Negative  Negative     Acceptable Yes        Imaging Results    None          ASSESMENT: Quinten Freitas is a 27 y.o.   at 37w1d with and dehydration    Discharge Diagnosis:   Patient Active Problem List   Diagnosis    Encounter for suspected PROM, with rupture of membranes not found       Status:Stable    Disposition:  discharged to home after rule out labor, cervix unchanged    Patient Instructions:   - Pt was given routine pregnancy instructions including to return to triage if she had any vaginal bleeding (other than spotting for the next 48hrs), any loss of fluid like her water broke, decreased fetal movement, or contractions Q 5min lasting for 2 or more hours. Pt was also instructed to drink copious water. Patient voiced understanding of all these instructions and was subsequently discharged home. Tylenol use and maternity belt use discussed. All questions answered. Pt left [facility] with good understanding of plan.   Preeclampsia/ROM/labor/fever/decreased FM with [facility] precautions discussed, voiced understanding     She will follow up with her primary OB as scheduled    Sally Pak MD  OB/GYN Hospitalist  6:39 PM 2022

## 2022-09-07 ENCOUNTER — HOSPITAL ENCOUNTER (INPATIENT)
Facility: HOSPITAL | Age: 27
LOS: 1 days | Discharge: HOME | End: 2022-09-08
Attending: OBSTETRICS & GYNECOLOGY | Admitting: OBSTETRICS & GYNECOLOGY
Payer: MEDICAID

## 2022-09-07 PROBLEM — Z03.71 ENCOUNTER FOR SUSPECTED PROM, WITH RUPTURE OF MEMBRANES NOT FOUND: Status: RESOLVED | Noted: 2022-09-04 | Resolved: 2022-09-07

## 2022-09-07 LAB
BASOPHILS # BLD AUTO: 0.06 X10(3)/MCL (ref 0–0.2)
BASOPHILS NFR BLD AUTO: 0.4 %
EOSINOPHIL # BLD AUTO: 0.12 X10(3)/MCL (ref 0–0.9)
EOSINOPHIL NFR BLD AUTO: 0.8 %
ERYTHROCYTE [DISTWIDTH] IN BLOOD BY AUTOMATED COUNT: 14.6 % (ref 11.5–17)
GROUP & RH: NORMAL
HBV SURFACE AG SERPL QL IA: NEGATIVE
HBV SURFACE AG SERPL QL IA: NONREACTIVE
HCT VFR BLD AUTO: 32.3 % (ref 37–47)
HGB BLD-MCNC: 10.5 GM/DL (ref 12–16)
HIV 1+2 AB+HIV1 P24 AG SERPL QL IA: NEGATIVE
IMM GRANULOCYTES # BLD AUTO: 0.14 X10(3)/MCL (ref 0–0.04)
IMM GRANULOCYTES NFR BLD AUTO: 0.9 %
INDIRECT COOMBS GEL: NORMAL
LYMPHOCYTES # BLD AUTO: 2.91 X10(3)/MCL (ref 0.6–4.6)
LYMPHOCYTES NFR BLD AUTO: 19.4 %
MCH RBC QN AUTO: 26.9 PG (ref 27–31)
MCHC RBC AUTO-ENTMCNC: 32.5 MG/DL (ref 33–36)
MCV RBC AUTO: 82.6 FL (ref 80–94)
MONOCYTES # BLD AUTO: 1.42 X10(3)/MCL (ref 0.1–1.3)
MONOCYTES NFR BLD AUTO: 9.5 %
NEUTROPHILS # BLD AUTO: 10.4 X10(3)/MCL (ref 2.1–9.2)
NEUTROPHILS NFR BLD AUTO: 69 %
NRBC BLD AUTO-RTO: 0 %
PLATELET # BLD AUTO: 259 X10(3)/MCL (ref 130–400)
PMV BLD AUTO: 11.8 FL (ref 7.4–10.4)
RBC # BLD AUTO: 3.91 X10(6)/MCL (ref 4.2–5.4)
RUBELLA IMMUNE STATUS: NORMAL
T PALLIDUM AB SER QL: NONREACTIVE
WBC # SPEC AUTO: 15 X10(3)/MCL (ref 4.5–11.5)

## 2022-09-07 PROCEDURE — 72200004 HC VAGINAL DELIVERY LEVEL I

## 2022-09-07 PROCEDURE — 99285 EMERGENCY DEPT VISIT HI MDM: CPT | Mod: 25

## 2022-09-07 PROCEDURE — 63600175 PHARM REV CODE 636 W HCPCS: Performed by: OBSTETRICS & GYNECOLOGY

## 2022-09-07 PROCEDURE — 63600175 PHARM REV CODE 636 W HCPCS

## 2022-09-07 PROCEDURE — 25000003 PHARM REV CODE 250: Performed by: OBSTETRICS & GYNECOLOGY

## 2022-09-07 PROCEDURE — 11000001 HC ACUTE MED/SURG PRIVATE ROOM

## 2022-09-07 PROCEDURE — 86850 RBC ANTIBODY SCREEN: CPT | Performed by: OBSTETRICS & GYNECOLOGY

## 2022-09-07 PROCEDURE — 86780 TREPONEMA PALLIDUM: CPT | Performed by: OBSTETRICS & GYNECOLOGY

## 2022-09-07 PROCEDURE — 72100002 HC LABOR CARE, 1ST 8 HOURS

## 2022-09-07 PROCEDURE — 36415 COLL VENOUS BLD VENIPUNCTURE: CPT | Performed by: OBSTETRICS & GYNECOLOGY

## 2022-09-07 PROCEDURE — 85025 COMPLETE CBC W/AUTO DIFF WBC: CPT | Performed by: OBSTETRICS & GYNECOLOGY

## 2022-09-07 PROCEDURE — 87340 HEPATITIS B SURFACE AG IA: CPT | Performed by: OBSTETRICS & GYNECOLOGY

## 2022-09-07 RX ORDER — PROCHLORPERAZINE EDISYLATE 5 MG/ML
5 INJECTION INTRAMUSCULAR; INTRAVENOUS EVERY 6 HOURS PRN
Status: DISCONTINUED | OUTPATIENT
Start: 2022-09-07 | End: 2022-09-08 | Stop reason: HOSPADM

## 2022-09-07 RX ORDER — OXYCODONE AND ACETAMINOPHEN 5; 325 MG/1; MG/1
1 TABLET ORAL EVERY 6 HOURS PRN
Status: DISCONTINUED | OUTPATIENT
Start: 2022-09-07 | End: 2022-09-08 | Stop reason: HOSPADM

## 2022-09-07 RX ORDER — OXYTOCIN/RINGER'S LACTATE 30/500 ML
95 PLASTIC BAG, INJECTION (ML) INTRAVENOUS ONCE
Status: COMPLETED | OUTPATIENT
Start: 2022-09-07 | End: 2022-09-07

## 2022-09-07 RX ORDER — DIPHENHYDRAMINE HCL 25 MG
25 CAPSULE ORAL EVERY 4 HOURS PRN
Status: DISCONTINUED | OUTPATIENT
Start: 2022-09-07 | End: 2022-09-08 | Stop reason: HOSPADM

## 2022-09-07 RX ORDER — OXYTOCIN/RINGER'S LACTATE 30/500 ML
334 PLASTIC BAG, INJECTION (ML) INTRAVENOUS ONCE
Status: DISCONTINUED | OUTPATIENT
Start: 2022-09-07 | End: 2022-09-08 | Stop reason: HOSPADM

## 2022-09-07 RX ORDER — DOCUSATE SODIUM 100 MG/1
200 CAPSULE, LIQUID FILLED ORAL 2 TIMES DAILY PRN
Status: DISCONTINUED | OUTPATIENT
Start: 2022-09-07 | End: 2022-09-08 | Stop reason: HOSPADM

## 2022-09-07 RX ORDER — IBUPROFEN 600 MG/1
600 TABLET ORAL EVERY 6 HOURS PRN
Status: DISCONTINUED | OUTPATIENT
Start: 2022-09-07 | End: 2022-09-08 | Stop reason: HOSPADM

## 2022-09-07 RX ORDER — SIMETHICONE 80 MG
1 TABLET,CHEWABLE ORAL 4 TIMES DAILY PRN
Status: DISCONTINUED | OUTPATIENT
Start: 2022-09-07 | End: 2022-09-08 | Stop reason: HOSPADM

## 2022-09-07 RX ORDER — HYDROCORTISONE 25 MG/G
CREAM TOPICAL 3 TIMES DAILY PRN
Status: DISCONTINUED | OUTPATIENT
Start: 2022-09-07 | End: 2022-09-08 | Stop reason: HOSPADM

## 2022-09-07 RX ORDER — SODIUM CHLORIDE 0.9 % (FLUSH) 0.9 %
10 SYRINGE (ML) INJECTION
Status: CANCELLED | OUTPATIENT
Start: 2022-09-07

## 2022-09-07 RX ORDER — DIPHENOXYLATE HYDROCHLORIDE AND ATROPINE SULFATE 2.5; .025 MG/1; MG/1
1 TABLET ORAL 4 TIMES DAILY PRN
Status: DISCONTINUED | OUTPATIENT
Start: 2022-09-07 | End: 2022-09-08 | Stop reason: HOSPADM

## 2022-09-07 RX ORDER — OXYCODONE AND ACETAMINOPHEN 10; 325 MG/1; MG/1
1 TABLET ORAL EVERY 4 HOURS PRN
Status: DISCONTINUED | OUTPATIENT
Start: 2022-09-07 | End: 2022-09-08 | Stop reason: HOSPADM

## 2022-09-07 RX ORDER — BUTORPHANOL TARTRATE 2 MG/ML
INJECTION INTRAMUSCULAR; INTRAVENOUS
Status: COMPLETED
Start: 2022-09-07 | End: 2022-09-07

## 2022-09-07 RX ORDER — MISOPROSTOL 100 UG/1
800 TABLET ORAL
Status: DISCONTINUED | OUTPATIENT
Start: 2022-09-07 | End: 2022-09-08 | Stop reason: HOSPADM

## 2022-09-07 RX ORDER — PRENATAL WITH FERROUS FUM AND FOLIC ACID 3080; 920; 120; 400; 22; 1.84; 3; 20; 10; 1; 12; 200; 27; 25; 2 [IU]/1; [IU]/1; MG/1; [IU]/1; MG/1; MG/1; MG/1; MG/1; MG/1; MG/1; UG/1; MG/1; MG/1; MG/1; MG/1
1 TABLET ORAL DAILY
Status: DISCONTINUED | OUTPATIENT
Start: 2022-09-07 | End: 2022-09-08 | Stop reason: HOSPADM

## 2022-09-07 RX ORDER — CALCIUM CARBONATE 200(500)MG
500 TABLET,CHEWABLE ORAL 3 TIMES DAILY PRN
Status: DISCONTINUED | OUTPATIENT
Start: 2022-09-07 | End: 2022-09-08 | Stop reason: HOSPADM

## 2022-09-07 RX ORDER — LIDOCAINE HYDROCHLORIDE 10 MG/ML
10 INJECTION INFILTRATION; PERINEURAL ONCE AS NEEDED
Status: DISCONTINUED | OUTPATIENT
Start: 2022-09-07 | End: 2022-09-08 | Stop reason: HOSPADM

## 2022-09-07 RX ORDER — ACETAMINOPHEN 325 MG/1
650 TABLET ORAL EVERY 6 HOURS PRN
Status: DISCONTINUED | OUTPATIENT
Start: 2022-09-07 | End: 2022-09-08 | Stop reason: HOSPADM

## 2022-09-07 RX ORDER — DIPHENHYDRAMINE HYDROCHLORIDE 50 MG/ML
25 INJECTION INTRAMUSCULAR; INTRAVENOUS EVERY 4 HOURS PRN
Status: DISCONTINUED | OUTPATIENT
Start: 2022-09-07 | End: 2022-09-08 | Stop reason: HOSPADM

## 2022-09-07 RX ORDER — MUPIROCIN 20 MG/G
OINTMENT TOPICAL
Status: DISCONTINUED | OUTPATIENT
Start: 2022-09-07 | End: 2022-09-08 | Stop reason: HOSPADM

## 2022-09-07 RX ORDER — HYDROCODONE BITARTRATE AND ACETAMINOPHEN 5; 325 MG/1; MG/1
1 TABLET ORAL EVERY 4 HOURS PRN
Status: DISCONTINUED | OUTPATIENT
Start: 2022-09-07 | End: 2022-09-08 | Stop reason: HOSPADM

## 2022-09-07 RX ORDER — METHYLERGONOVINE MALEATE 0.2 MG/ML
200 INJECTION INTRAVENOUS
Status: DISCONTINUED | OUTPATIENT
Start: 2022-09-07 | End: 2022-09-08 | Stop reason: HOSPADM

## 2022-09-07 RX ORDER — CARBOPROST TROMETHAMINE 250 UG/ML
250 INJECTION, SOLUTION INTRAMUSCULAR
Status: DISCONTINUED | OUTPATIENT
Start: 2022-09-07 | End: 2022-09-08 | Stop reason: HOSPADM

## 2022-09-07 RX ORDER — ONDANSETRON 4 MG/1
8 TABLET, ORALLY DISINTEGRATING ORAL EVERY 8 HOURS PRN
Status: DISCONTINUED | OUTPATIENT
Start: 2022-09-07 | End: 2022-09-08 | Stop reason: HOSPADM

## 2022-09-07 RX ORDER — OXYTOCIN/RINGER'S LACTATE 30/500 ML
PLASTIC BAG, INJECTION (ML) INTRAVENOUS
Status: DISPENSED
Start: 2022-09-07 | End: 2022-09-07

## 2022-09-07 RX ORDER — ONDANSETRON 4 MG/1
8 TABLET, ORALLY DISINTEGRATING ORAL EVERY 8 HOURS PRN
Status: CANCELLED | OUTPATIENT
Start: 2022-09-07

## 2022-09-07 RX ORDER — PROCHLORPERAZINE EDISYLATE 5 MG/ML
5 INJECTION INTRAMUSCULAR; INTRAVENOUS EVERY 6 HOURS PRN
Status: CANCELLED | OUTPATIENT
Start: 2022-09-07

## 2022-09-07 RX ORDER — SIMETHICONE 80 MG
1 TABLET,CHEWABLE ORAL EVERY 6 HOURS PRN
Status: DISCONTINUED | OUTPATIENT
Start: 2022-09-07 | End: 2022-09-08 | Stop reason: HOSPADM

## 2022-09-07 RX ORDER — SODIUM CHLORIDE, SODIUM LACTATE, POTASSIUM CHLORIDE, CALCIUM CHLORIDE 600; 310; 30; 20 MG/100ML; MG/100ML; MG/100ML; MG/100ML
INJECTION, SOLUTION INTRAVENOUS CONTINUOUS
Status: DISCONTINUED | OUTPATIENT
Start: 2022-09-07 | End: 2022-09-08 | Stop reason: HOSPADM

## 2022-09-07 RX ADMIN — PRENATAL VITAMINS-IRON FUMARATE 27 MG IRON-FOLIC ACID 0.8 MG TABLET 1 TABLET: at 10:09

## 2022-09-07 RX ADMIN — OXYCODONE HYDROCHLORIDE AND ACETAMINOPHEN 1 TABLET: 5; 325 TABLET ORAL at 06:09

## 2022-09-07 RX ADMIN — Medication 95 MILLI-UNITS/MIN: at 03:09

## 2022-09-07 RX ADMIN — IBUPROFEN 600 MG: 600 TABLET ORAL at 03:09

## 2022-09-07 RX ADMIN — Medication 95 MILLI-UNITS/MIN: at 05:09

## 2022-09-07 RX ADMIN — OXYCODONE AND ACETAMINOPHEN 1 TABLET: 10; 325 TABLET ORAL at 05:09

## 2022-09-07 RX ADMIN — IBUPROFEN 600 MG: 600 TABLET ORAL at 10:09

## 2022-09-07 RX ADMIN — BENZOCAINE AND LEVOMENTHOL: 200; 5 SPRAY TOPICAL at 04:09

## 2022-09-07 RX ADMIN — BUTORPHANOL TARTRATE 2 MG: 2 INJECTION, SOLUTION INTRAMUSCULAR; INTRAVENOUS at 03:09

## 2022-09-07 RX ADMIN — SODIUM CHLORIDE, POTASSIUM CHLORIDE, SODIUM LACTATE AND CALCIUM CHLORIDE: 600; 310; 30; 20 INJECTION, SOLUTION INTRAVENOUS at 03:09

## 2022-09-07 RX ADMIN — IBUPROFEN 600 MG: 600 TABLET ORAL at 04:09

## 2022-09-07 NOTE — ED PROVIDER NOTES
YARELY NOTE  [facility]     Admit Date: 2022  YARELY Physician: Sally Pak  Primary OBGYN: Dr. Jayesh France    Admit Diagnosis/Chief Complaint:  second stage labor      Hospital Course:  Quinten Freitas is a 27 y.o.  at 37w4d presents complaining of ctx pain.  No LOF or VB. +FM       LMP  (LMP Unknown)   Breastfeeding Unknown        General: in no apparent distress well developed and well nourished non-toxic in no respiratory distress and acyanotic alert oriented times 3  Cardiovascular: regular rate and rhythm no murmurs  Respiratory: unlabored  Abdominal: soft, nontender, nondistended, no abnormal masses, no epigastric pain fundus soft, nontender 37 weeks size FHT present  Back: lumbar tenderness absent CVA tenderness none suprapubic tenderness absent  Extremeties no redness or tenderness in the calves or thighs no edema    SVE (PeriWATCH)  Dilation (cm): 10  Effacement (%): 100  Station: -1  Examined by:: aleja szymanski  Simplified Fernandes Score: 8         EFM: Cat 1, 145 modBTV, +accel, no decel (reassuring, reactive)  TOCO:  ctx q4 mins      Medical Decision Making:      LABS:   No results found for this or any previous visit (from the past 24 hour(s)).    Imaging Results    None          ASSESMENT: Quinten Freitas is a 27 y.o.   at 37w4d with     Discharge Diagnosis:   Patient Active Problem List   Diagnosis    Precipitous delivery     See delivery note for details    Sally Pak MD  OB/GYN Hospitalist  3:23 AM 2022

## 2022-09-07 NOTE — L&D DELIVERY NOTE
[facility] - Labor and Delivery  OBGYN  Operative Note    SUMMARY     Date of Procedure: 2022    Procedure: Spontaneous Vaginal Delivery    Surgeon: Sally Pak MD OB Hospitalist    Pre-Operative Diagnosis:   Patient Active Problem List   Diagnosis    Precipitous delivery       Post-Operative Diagnosis:   Same with addition of  Single liveborn infant now s/p  37w4d without complications  Marginal placenta      Anesthesia: none    Procedure in Detail: With good maternal effort a viable liveborn infant was delivered as the fetal head delivered in direct OA position after AROM at 2+ station.  Nuchal cord was present x2, Remainder of infant delivered without difficulty with right shoulder anterior followed by remaining body.    placed on maternal abdomen and bulb suctioning performed. 3VC was cut and clamped after 30 sec delay and cord blood obtained. The vagina, perineum, and cervix were examined. The placenta then delivered spontaneously and was noted to be intact. Lacerations were not present. After any necessary repairs were performed, all instruments and sponges were removed from the vagina. Sponge, lap, and needle counts were correct after the procedure.    Repair Suture: None    Infant: Liveborn male infant with APGARS of 8/9; 3 vessel umbilical cord. Moving both upper extremities well. Weight of baby not yet obtained at time of this note.   [facility] team present at bedside.    Complications: No    Estimated Blood Loss (EBL): 150 cc           Condition: Mother and baby bonding well    A qualified resident was not available to assist.    Sally Pak MD  OB/GYN Hospitalist  :29 AM

## 2022-09-07 NOTE — H&P
HISTORY AND PHYSICAL                                                OBSTETRICS          Subjective:      Quinten rFeitas is a 27 y.o.  female with IUP at 37w4d weeks gestation who presents to L&D for 2nd stage labor with painful ctx. No LOF or VB. +FM    PMHx: No past medical history on file.    PSHx: No past surgical history on file.    All: Review of patient's allergies indicates:  No Known Allergies    Meds:   Medications Prior to Admission   Medication Sig Dispense Refill Last Dose    prenatal vit/iron fum/folic ac (PRENATAL 1+1 ORAL) Take by mouth.          SH:   Social History     Socioeconomic History    Marital status: Single   Tobacco Use    Smoking status: Never    Smokeless tobacco: Never   Substance and Sexual Activity    Alcohol use: Not Currently    Drug use: Never    Sexual activity: Yes       FH: No family history on file.    OBHx:   OB History    Para Term  AB Living   2 1 1 0 0 1   SAB IAB Ectopic Multiple Live Births   0 0 0 0 1      # Outcome Date GA Lbr Jose/2nd Weight Sex Delivery Anes PTL Lv   2             1 Term 20    F Vag-Spont EPI  ZAYNAB       Objective:      LMP  (LMP Unknown)   Breastfeeding Unknown   There is no height or weight on file to calculate BMI.    General:   alert and cooperative   HEENT:  normocephalic, atraumatic   Lungs:   clear to auscultation bilaterally   Heart:   regular rate and rhythm, S1, S2 normal   Abdomen:  gravid, non-tender   Extremities non-tender, no edema   Derm: no rashes or lesions   Psych: appropriate mood and affect   Pelvis:  adequate       EFM: Cat 1, 145 modBTV, +accel, no decel (reassuring, reactive)  TOCO:  ctx q4 mins    SVE (PeriWATCH)  Dilation (cm): 6.5  Effacement (%): 80  Station: -1  Examined by:: aleja szymanski  Simplified Fernandes Score: 8     Lab Review  Blood Type O POS  Hep B neg  RNI  RPR neg  HIV neg     Lab Results   Component Value Date    WBC 12.0 (H) 2022    HGB 10.7 (L) 2022    HCT 31.7  (L) 2022    MCV 84.5 2022     2022           Assessment:     27 y.o.  at 37w4d weeks gestation.  2nd stage labor and progressed quickly to    Active Hospital Problems    Diagnosis  POA    *Precipitous delivery [O62.3]  No      Resolved Hospital Problems   No resolved problems to display.          Plan:     1. Risks, benefits, alternatives and possible complications have been discussed in detail with the patient. All questions have been answered, and Ms. Freitas has voiced understanding and agrees to the treatment plan.  2. Consents signed and in chart  3. Admit to Labor and Delivery unit  4. Patient progressed to  see notes for details     Sally Pak MD  OB/GYN Hospitalist  3:20 AM 2022

## 2022-09-07 NOTE — H&P
[facility]                       [address] [address]    PATIENT NAME:       ERIC BAEZ  YOB: 1995  CSN:                [id number]   MRN:                [id number]  ADMIT DATE:         2022 02:16:00  PHYSICIAN:          Jayesh France Jr, MD                        HISTORY AND PHYSICAL      Patient is a 27-year-old  2, para 1, with pregnancy at 37 weeks 4 days,   presents to the obstetric unit complaining of contractions.  Patient noted to be   in active labor, regular contractions.  She was admitted for delivery.  She has   had prenatal care since early in pregnancy.  Pregnancy has been uneventful.    Refer to the OB flow sheet for history.  Vitals were stable.  She was afebrile.    PHYSICAL EXAMINATION:  Within normal limits.    ASSESSMENT:  Pregnancy at 37 and 4 weeks, active labor with advanced cervical   dilation.    PLAN:  Admit for delivery.        ______________________________  Jayesh France Jr, MD    DJE/AQS  DD:  2022  Time:  05:41AM  DT:  2022  Time:  07:42AM  Job #:  [id number]      HISTORY AND PHYSICAL

## 2022-09-07 NOTE — OP NOTE
[facility]                       [address] [address]    PATIENT NAME:      EIRC BAEZ  YOB: 1995  CSN:               [id number]  MRN:               [id number]  ADMIT DATE:        09/07/2022 02:16:00  PHYSICIAN:         Jayesh France Jr, MD                          OPERATIVE REPORT      DATE OF SURGERY:        SURGEON:  Jayesh France Jr, MD    Patient presented to the OB ED in active labor and subsequently underwent a   precipitous vaginal delivery that was attended by Dr. Pak.  The infant was   delivered without incident.  The placenta was delivered spontaneously.  IV   Pitocin drip was begun.  Uterus was massaged, noted to be firm at the umbilicus.    Vaginal and cervical inspection revealed no lacerations or tears.  Apgars,   weight pending.  Blood loss 350.        ______________________________  Jayesh France Jr, MD    DJE/AQS  DD:  09/07/2022  Time:  05:42AM  DT:  09/07/2022  Time:  07:54AM  Job #:  [id number]      OPERATIVE REPORT

## 2022-09-07 NOTE — PLAN OF CARE
"  Problem: Adult Inpatient Plan of Care  Goal: Plan of Care Review  Outcome: Ongoing, Progressing  Flowsheets (Taken 9/7/2022 0544)  Plan of Care Reviewed With:   patient   significant other  Goal: Patient-Specific Goal (Individualized)  Outcome: Ongoing, Progressing  Flowsheets (Taken 9/7/2022 0544)  Patient-Specific Goals (Include Timeframe): "I want to get some rest."  Goal: Absence of Hospital-Acquired Illness or Injury  Outcome: Ongoing, Progressing  Intervention: Prevent Infection  Flowsheets (Taken 9/7/2022 0544)  Infection Prevention: hand hygiene promoted  Goal: Optimal Comfort and Wellbeing  Outcome: Ongoing, Progressing  Goal: Readiness for Transition of Care  Outcome: Ongoing, Progressing     Problem: Adjustment to Role Transition (Postpartum Vaginal Delivery)  Goal: Successful Maternal Role Transition  Outcome: Ongoing, Progressing     Problem: Bleeding (Postpartum Vaginal Delivery)  Goal: Hemostasis  Outcome: Ongoing, Progressing     Problem: Infection (Postpartum Vaginal Delivery)  Goal: Absence of Infection Signs/Symptoms  Outcome: Ongoing, Progressing     Problem: Pain (Postpartum Vaginal Delivery)  Goal: Acceptable Pain Control  Outcome: Ongoing, Progressing     Problem: Urinary Retention (Postpartum Vaginal Delivery)  Goal: Effective Urinary Elimination  Outcome: Ongoing, Progressing     "

## 2022-09-08 VITALS
TEMPERATURE: 98 F | DIASTOLIC BLOOD PRESSURE: 71 MMHG | SYSTOLIC BLOOD PRESSURE: 114 MMHG | HEART RATE: 96 BPM | RESPIRATION RATE: 14 BRPM

## 2022-09-08 PROCEDURE — 25000003 PHARM REV CODE 250: Performed by: OBSTETRICS & GYNECOLOGY

## 2022-09-08 RX ORDER — HYDROCODONE BITARTRATE AND ACETAMINOPHEN 5; 325 MG/1; MG/1
1 TABLET ORAL EVERY 6 HOURS PRN
Qty: 28 TABLET | Refills: 0 | Status: SHIPPED | OUTPATIENT
Start: 2022-09-08

## 2022-09-08 RX ADMIN — DOCUSATE SODIUM 200 MG: 100 CAPSULE, LIQUID FILLED ORAL at 07:09

## 2022-09-08 RX ADMIN — OXYCODONE AND ACETAMINOPHEN 1 TABLET: 10; 325 TABLET ORAL at 12:09

## 2022-09-08 RX ADMIN — IBUPROFEN 600 MG: 600 TABLET ORAL at 04:09

## 2022-09-08 NOTE — DISCHARGE SUMMARY
[facility]                       [address] [address]    PATIENT NAME:       ERIC BAEZ  YOB: 1995  CSN:                [id number]   MRN:                [id number]  ADMIT DATE:         09/07/2022 02:16:00  PHYSICIAN:          Jayesh France Jr, MD                          DISCHARGE SUMMARY    DATE OF DISCHARGE:      HOSPITAL COURSE:  Patient is status post vaginal delivery without incident.  She   was admitted to the postpartal GYN service, where she had an uneventful and   speedy recovery.  She was ambulatory, tolerating a regular diet, her vitals   stable, she is afebrile.  She has had normal bowel and bladder function.  She   will be discharged home on postpartum day 2.    CONDITION ON DISCHARGE:  Stable.    DIET:  Regular.    ACTIVITY:  Pelvic rest.    MEDICATIONS:    1. Percocet p.r.n.   2. Motrin p.r.n.    FOLLOWUP:  With Román in 3 weeks.        ______________________________  Jayesh France Jr, MD    DJE/AQS  DD:  09/08/2022  Time:  07:13AM  DT:  09/08/2022  Time:  07:59AM  Job #:  [id number]      DISCHARGE SUMMARY

## 2022-09-08 NOTE — PLAN OF CARE
Problem:  Fall Injury Risk  Goal: Absence of Fall, Infant Drop and Related Injury  Outcome: Ongoing, Progressing     Problem: Adult Inpatient Plan of Care  Goal: Plan of Care Review  Outcome: Ongoing, Progressing  Goal: Patient-Specific Goal (Individualized)  Outcome: Ongoing, Progressing  Goal: Absence of Hospital-Acquired Illness or Injury  Outcome: Ongoing, Progressing  Goal: Optimal Comfort and Wellbeing  Outcome: Ongoing, Progressing  Goal: Readiness for Transition of Care  Outcome: Ongoing, Progressing     Problem: Infection  Goal: Absence of Infection Signs and Symptoms  Outcome: Ongoing, Progressing     Problem: Bleeding (Postpartum Vaginal Delivery)  Goal: Hemostasis  Outcome: Ongoing, Progressing     Problem: Infection (Postpartum Vaginal Delivery)  Goal: Absence of Infection Signs/Symptoms  Outcome: Ongoing, Progressing     Problem: Pain (Postpartum Vaginal Delivery)  Goal: Acceptable Pain Control  Outcome: Ongoing, Progressing     Problem: Urinary Retention (Postpartum Vaginal Delivery)  Goal: Effective Urinary Elimination  Outcome: Ongoing, Progressing

## 2022-09-18 ENCOUNTER — HISTORICAL (OUTPATIENT)
Dept: ADMINISTRATIVE | Facility: HOSPITAL | Age: 27
End: 2022-09-18
Payer: MEDICAID

## 2022-09-19 ENCOUNTER — HISTORICAL (OUTPATIENT)
Dept: ADMINISTRATIVE | Facility: HOSPITAL | Age: 27
End: 2022-09-19
Payer: MEDICAID

## 2022-09-20 ENCOUNTER — HISTORICAL (OUTPATIENT)
Dept: ADMINISTRATIVE | Facility: HOSPITAL | Age: 27
End: 2022-09-20
Payer: MEDICAID

## 2024-01-22 ENCOUNTER — OFFICE VISIT (OUTPATIENT)
Dept: URGENT CARE | Facility: CLINIC | Age: 29
End: 2024-01-22

## 2024-01-22 VITALS
BODY MASS INDEX: 30 KG/M2 | OXYGEN SATURATION: 99 % | HEART RATE: 76 BPM | TEMPERATURE: 98 F | SYSTOLIC BLOOD PRESSURE: 120 MMHG | RESPIRATION RATE: 18 BRPM | HEIGHT: 62 IN | WEIGHT: 163 LBS | DIASTOLIC BLOOD PRESSURE: 81 MMHG

## 2024-01-22 DIAGNOSIS — J32.9 SINUSITIS, BACTERIAL: Primary | ICD-10-CM

## 2024-01-22 DIAGNOSIS — B96.89 SINUSITIS, BACTERIAL: Primary | ICD-10-CM

## 2024-01-22 PROCEDURE — 99213 OFFICE O/P EST LOW 20 MIN: CPT | Mod: PBBFAC | Performed by: FAMILY MEDICINE

## 2024-01-22 PROCEDURE — 99203 OFFICE O/P NEW LOW 30 MIN: CPT | Mod: S$PBB,,, | Performed by: FAMILY MEDICINE

## 2024-01-22 RX ORDER — AMOXICILLIN AND CLAVULANATE POTASSIUM 875; 125 MG/1; MG/1
1 TABLET, FILM COATED ORAL 2 TIMES DAILY
Qty: 20 TABLET | Refills: 0 | Status: SHIPPED | OUTPATIENT
Start: 2024-01-22 | End: 2024-02-01

## 2024-01-22 NOTE — PROGRESS NOTES
"Subjective:       Patient ID: Quinten Freitas is a 28 y.o. female.    Vitals:  height is 5' 2" (1.575 m) and weight is 73.9 kg (163 lb). Her oral temperature is 97.5 °F (36.4 °C). Her blood pressure is 120/81 and her pulse is 76. Her respiration is 18 and oxygen saturation is 99%.     Chief Complaint: URI (Chest/nasal congestion, sore throat, cough, body pains for 1 week. Pt declined swabs, requests to be tested for bronchitis or pneumonia )    Patient with now symptoms, sinus pressure purulent rhinorrhea.  Mild cough with occasional chest discomfort only when coughing.  Declines testing    URI   There has been no fever. Pertinent negatives include no abdominal pain, dysuria, joint swelling, neck pain, rash, sore throat, swollen glands or wheezing.       HENT:  Negative for sore throat.    Neck: Negative for neck pain.   Respiratory:  Negative for wheezing.    Gastrointestinal:  Negative for abdominal pain.   Genitourinary:  Negative for dysuria.   Skin:  Negative for rash.     Objective:   Physical Exam   Constitutional: She appears well-developed.  Non-toxic appearance. She does not appear ill. No distress.   HENT:   Head: Atraumatic.   Nose: No purulent discharge. Right sinus exhibits maxillary sinus tenderness. Right sinus exhibits no frontal sinus tenderness. Left sinus exhibits maxillary sinus tenderness. Left sinus exhibits no frontal sinus tenderness.   Mouth/Throat: Uvula is midline. No oropharyngeal exudate or posterior oropharyngeal erythema.   Eyes: Right eye exhibits no discharge. Left eye exhibits no discharge. Extraocular movement intact   Neck: Neck supple. No neck rigidity present.   Cardiovascular: Regular rhythm.   Pulmonary/Chest: Effort normal and breath sounds normal. No respiratory distress. She has no wheezes. She has no rales.   Lymphadenopathy:     She has no cervical adenopathy.   Neurological: She is alert.   Skin: Skin is warm, dry and not diaphoretic.   Psychiatric: Her behavior " is normal.   Nursing note and vitals reviewed.      Assessment:     1. Sinusitis, bacterial          Plan:   Will treat as presumed bacterial sinusitis.  Take medications as prescribed.  Consider intranasal steroids like Flonase and other over-the-counter medications to treat your symptoms.  Consider saline nasal rinses.      Please follow instructions on patient education material.  Follow up with your PCP or return to urgent care in 4-5 days if symptoms are not improving. Seek care immediately if new or worsening symptoms develop.    Sinusitis, bacterial  -     amoxicillin-clavulanate 875-125mg (AUGMENTIN) 875-125 mg per tablet; Take 1 tablet by mouth 2 (two) times daily. for 10 days  Dispense: 20 tablet; Refill: 0        Please note: This chart was completed via voice to text dictation. It may contain typographical/word recognition errors. If there are any questions, please contact the provider for final clarification.

## 2024-04-22 ENCOUNTER — TELEPHONE (OUTPATIENT)
Dept: URGENT CARE | Facility: CLINIC | Age: 29
End: 2024-04-22

## 2024-04-22 ENCOUNTER — OFFICE VISIT (OUTPATIENT)
Dept: URGENT CARE | Facility: CLINIC | Age: 29
End: 2024-04-22

## 2024-04-22 VITALS
SYSTOLIC BLOOD PRESSURE: 102 MMHG | HEART RATE: 65 BPM | DIASTOLIC BLOOD PRESSURE: 65 MMHG | RESPIRATION RATE: 18 BRPM | OXYGEN SATURATION: 97 % | HEIGHT: 64 IN | TEMPERATURE: 98 F | BODY MASS INDEX: 26.94 KG/M2 | WEIGHT: 157.81 LBS

## 2024-04-22 DIAGNOSIS — K59.00 CONSTIPATION, UNSPECIFIED CONSTIPATION TYPE: ICD-10-CM

## 2024-04-22 DIAGNOSIS — B96.89 BV (BACTERIAL VAGINOSIS): Primary | ICD-10-CM

## 2024-04-22 DIAGNOSIS — N30.01 ACUTE CYSTITIS WITH HEMATURIA: ICD-10-CM

## 2024-04-22 DIAGNOSIS — N76.0 BV (BACTERIAL VAGINOSIS): Primary | ICD-10-CM

## 2024-04-22 DIAGNOSIS — R10.2 SUPRAPUBIC ABDOMINAL PAIN: ICD-10-CM

## 2024-04-22 LAB
B-HCG UR QL: NEGATIVE
BILIRUB UR QL STRIP: NEGATIVE
CLUE CELLS VAG QL WET PREP: ABNORMAL
CTP QC/QA: YES
GLUCOSE UR QL STRIP: NEGATIVE
KETONES UR QL STRIP: NEGATIVE
LEUKOCYTE ESTERASE UR QL STRIP: POSITIVE
PH, POC UA: 6
POC BLOOD, URINE: POSITIVE
POC NITRATES, URINE: NEGATIVE
PROT UR QL STRIP: NEGATIVE
SP GR UR STRIP: 1.02 (ref 1–1.03)
T VAGINALIS VAG QL WET PREP: ABNORMAL
UROBILINOGEN UR STRIP-ACNC: 0.2 (ref 0.1–1.1)
WBC #/AREA VAG WET PREP: ABNORMAL
YEAST SPEC QL WET PREP: ABNORMAL

## 2024-04-22 PROCEDURE — 87210 SMEAR WET MOUNT SALINE/INK: CPT

## 2024-04-22 PROCEDURE — 87086 URINE CULTURE/COLONY COUNT: CPT

## 2024-04-22 PROCEDURE — 99213 OFFICE O/P EST LOW 20 MIN: CPT | Mod: S$PBB,,,

## 2024-04-22 PROCEDURE — 99214 OFFICE O/P EST MOD 30 MIN: CPT | Mod: PBBFAC

## 2024-04-22 PROCEDURE — 81025 URINE PREGNANCY TEST: CPT | Mod: PBBFAC

## 2024-04-22 PROCEDURE — 81003 URINALYSIS AUTO W/O SCOPE: CPT | Mod: PBBFAC

## 2024-04-22 RX ORDER — METRONIDAZOLE 7.5 MG/G
1 GEL VAGINAL NIGHTLY
Qty: 5 APPLICATOR | Refills: 0 | Status: SHIPPED | OUTPATIENT
Start: 2024-04-22 | End: 2024-04-27

## 2024-04-22 RX ORDER — NITROFURANTOIN 25; 75 MG/1; MG/1
100 CAPSULE ORAL 2 TIMES DAILY
Qty: 14 CAPSULE | Refills: 0 | Status: SHIPPED | OUTPATIENT
Start: 2024-04-22 | End: 2024-04-29

## 2024-04-22 NOTE — PROGRESS NOTES
Your vaginal swab test indicates positive clue cells which may be an indicator for bacterial vaginosis. A prescription for Flagyl has been sent to listed pharmacy. Please avoid alcohol while taking Flagyl, may cause extreme side effects. Avoid any sexual activity until prescription has been fully completed. Follow up with GYN with additional concerns.

## 2024-04-22 NOTE — PROGRESS NOTES
"Subjective:       Patient ID: Quinten Freitas is a 28 y.o. female.    Vitals:  height is 5' 4.17" (1.63 m) and weight is 71.6 kg (157 lb 12.8 oz). Her oral temperature is 98.3 °F (36.8 °C). Her blood pressure is 102/65 and her pulse is 65. Her respiration is 18 and oxygen saturation is 97%.     Chief Complaint: Abdominal Pain (Sore and swelling per patient, weeks per patient. Pt reports that her vaginal area has a "medicine smell")    Agree with chief complaint, 28-year-old  female reports symptoms x 2 weeks.  Denies any concerns for STDs, soap or detergents changes.  Also reports hard stools recently, denies constipation history, has not tried any over-the-counter treatment. Patient's last menstrual period was 03/20/2024 (approximate)    Abdominal Pain  Associated symptoms include constipation. Pertinent negatives include no diarrhea, dysuria, fever, frequency, hematuria, nausea or vomiting. Anorexia: UPT.      Constitution: Negative for chills and fever.   Gastrointestinal:  Positive for abdominal pain and constipation. Negative for nausea, vomiting, diarrhea and rectal pain.   Genitourinary:  Positive for vaginal discharge and vaginal odor. Negative for dysuria, frequency, flank pain and hematuria.       Objective:      Physical Exam   Constitutional: She is oriented to person, place, and time. She is cooperative. She is easily aroused. She does not appear ill. awake  HENT:   Head: Normocephalic and atraumatic.   Ears:   Right Ear: Tympanic membrane normal.   Left Ear: Tympanic membrane normal.   Nose: Nose normal.   Eyes: Conjunctivae and lids are normal.   Cardiovascular: Normal rate, regular rhythm, S1 normal, S2 normal and normal heart sounds.   Pulmonary/Chest: Effort normal and breath sounds normal.   Abdominal: Normal appearance and bowel sounds are normal. Soft. flat abdomen There is abdominal tenderness in the suprapubic area. There is no left CVA tenderness and no right CVA " tenderness.   Genitourinary:         Comments: deferred     Neurological: She is alert, oriented to person, place, and time and easily aroused. Gait normal. GCS eye subscore is 4. GCS verbal subscore is 5. GCS motor subscore is 6.   Skin: Skin is warm, dry and intact. Capillary refill takes less than 2 seconds.   Psychiatric: Her behavior is normal.   Nursing note and vitals reviewed.        Assessment:       1. Acute cystitis with hematuria    2. Suprapubic abdominal pain    3. Constipation, unspecified constipation type          Plan:     Encouraged patient to increase fiber and water intake to help with constipation, may take OTC stool softener daily. Will treat for uncomplicated UTI with Macrobid, Urine culture & wet prep are pending. Staff will contact patient with any positive findings.     Acute cystitis with hematuria  -     Urine culture  -     nitrofurantoin, macrocrystal-monohydrate, (MACROBID) 100 MG capsule; Take 1 capsule (100 mg total) by mouth 2 (two) times daily. for 7 days  Dispense: 14 capsule; Refill: 0    Suprapubic abdominal pain  -     POCT urine pregnancy  -     POCT Urinalysis, Dipstick, Automated, W/O Scope  -     Wet Prep, Genital    Constipation, unspecified constipation type           Results for orders placed or performed in visit on 04/22/24   POCT urine pregnancy   Result Value Ref Range    POC Preg Test, Ur Negative Negative     Acceptable Yes    POCT Urinalysis, Dipstick, Automated, W/O Scope   Result Value Ref Range    POC Blood, Urine Positive (A) Negative    POC Bilirubin, Urine Negative Negative    POC Urobilinogen, Urine 0.2 0.1 - 1.1    POC Ketones, Urine Negative Negative    POC Protein, Urine Negative Negative    POC Nitrates, Urine Negative Negative    POC Glucose, Urine Negative Negative    pH, UA 6.0     POC Specific Gravity, Urine 1.020 1.003 - 1.029    POC Leukocytes, Urine Positive (A) Negative

## 2024-04-22 NOTE — LETTER
April 22, 2024      [facility]  [facility] [address]  Phone: [phone]       Patient: Quinten Freitas   YOB: 1995  Date of Visit: 04/22/2024    To Whom It May Concern:    Yamilex Freitas  was at [facility] on 04/22/2024. The patient may return to work/school on 04/23/2024 with no restrictions. If you have any questions or concerns, or if I can be of further assistance, please do not hesitate to contact me.    Sincerely,    TIMOTEO Ellis

## 2024-04-23 NOTE — TELEPHONE ENCOUNTER
----- Message from TIMOTEO Ellis sent at 4/22/2024  4:00 PM CDT -----  Your vaginal swab test indicates positive clue cells which may be an indicator for bacterial vaginosis. A prescription for Flagyl has been sent to listed pharmacy. Please avoid alcohol while taking Flagyl, may cause extreme side effects. Avoid any sexual activity until prescription has been fully completed. Follow up with GYN with additional concerns.

## 2024-04-23 NOTE — TELEPHONE ENCOUNTER
Patient returned call, name and  verified. Patient informed of results and providers recommendations. Patient verbalized understanding.

## 2024-04-24 LAB — BACTERIA UR CULT: NORMAL

## 2024-11-25 ENCOUNTER — OFFICE VISIT (OUTPATIENT)
Dept: URGENT CARE | Facility: CLINIC | Age: 29
End: 2024-11-25

## 2024-11-25 VITALS
HEIGHT: 62 IN | DIASTOLIC BLOOD PRESSURE: 78 MMHG | OXYGEN SATURATION: 99 % | SYSTOLIC BLOOD PRESSURE: 112 MMHG | WEIGHT: 162.25 LBS | HEART RATE: 83 BPM | TEMPERATURE: 98 F | RESPIRATION RATE: 18 BRPM | BODY MASS INDEX: 29.86 KG/M2

## 2024-11-25 DIAGNOSIS — R05.9 COUGH, UNSPECIFIED TYPE: ICD-10-CM

## 2024-11-25 DIAGNOSIS — J06.9 VIRAL URI: Primary | ICD-10-CM

## 2024-11-25 LAB
CTP QC/QA: YES
CTP QC/QA: YES
POC MOLECULAR INFLUENZA A AGN: NEGATIVE
POC MOLECULAR INFLUENZA B AGN: NEGATIVE
SARS-COV-2 AG RESP QL IA.RAPID: NEGATIVE

## 2024-11-25 PROCEDURE — 99214 OFFICE O/P EST MOD 30 MIN: CPT | Mod: PBBFAC | Performed by: FAMILY MEDICINE

## 2024-11-25 PROCEDURE — 99213 OFFICE O/P EST LOW 20 MIN: CPT | Mod: S$PBB,,, | Performed by: FAMILY MEDICINE

## 2024-11-25 PROCEDURE — 87502 INFLUENZA DNA AMP PROBE: CPT | Mod: PBBFAC | Performed by: FAMILY MEDICINE

## 2024-11-25 PROCEDURE — 87811 SARS-COV-2 COVID19 W/OPTIC: CPT | Mod: PBBFAC | Performed by: FAMILY MEDICINE

## 2024-11-25 NOTE — LETTER
November 25, 2024      [facility]  [facility] [address]  Phone: [phone]       Patient: Quinten Freitas   YOB: 1995  Date of Visit: 11/25/2024    To Whom It May Concern:    Yamilex Freitas  was at [facility] on 11/25/2024. The patient may return to work/school on NOV 26 2024 with no  restrictions. If you have any questions or concerns, or if I can be of further assistance, please do not hesitate to contact me.    Sincerely,    LATRICE TAYLOR MD

## 2024-11-25 NOTE — PROGRESS NOTES
"Subjective:       Patient ID: Quinten Freitas is a 29 y.o. female.    Vitals:  height is 5' 2" (1.575 m) and weight is 73.6 kg (162 lb 4.1 oz). Her temperature is 98.4 °F (36.9 °C). Her blood pressure is 112/78 and her pulse is 83. Her respiration is 18 and oxygen saturation is 99%.     Chief Complaint: Cough (X 2days)    Patient with 2 days of cough, clear rhinorrhea, no fever.  No wheezing.    All other systems are negative    Chart reviewed    Objective:   Physical Exam   Constitutional: She appears well-developed.  Non-toxic appearance. She does not appear ill. No distress.   HENT:   Head: Atraumatic.   Nose: No purulent discharge. Right sinus exhibits no maxillary sinus tenderness and no frontal sinus tenderness. Left sinus exhibits no maxillary sinus tenderness and no frontal sinus tenderness.   Mouth/Throat: Uvula is midline. No posterior oropharyngeal erythema.   Eyes: Right eye exhibits no discharge. Left eye exhibits no discharge. Extraocular movement intact   Neck: Neck supple. No neck rigidity present.   Cardiovascular: Regular rhythm.   Pulmonary/Chest: Effort normal and breath sounds normal. No respiratory distress. She has no wheezes. She has no rhonchi. She has no rales.   Lymphadenopathy:     She has no cervical adenopathy.   Neurological: She is alert.   Skin: Skin is warm, dry and not diaphoretic.   Psychiatric: Her behavior is normal.   Nursing note and vitals reviewed.      Results for orders placed or performed in visit on 11/25/24   SARS Coronavirus 2 Antigen, POCT Manual Read    Collection Time: 11/25/24  3:34 PM   Result Value Ref Range    SARS Coronavirus 2 Antigen Negative Negative     Acceptable Yes    POCT Influenza A/B Molecular    Collection Time: 11/25/24  3:38 PM   Result Value Ref Range    POC Molecular Influenza A Ag Negative Negative    POC Molecular Influenza B Ag Negative Negative     Acceptable Yes          1. Viral URI    2. Cough, " unspecified type            Plan:   Please read patient education material and follow COVID-19 precautions.   Use over-the-counter/prescribed medications as needed for symptoms.  Return if not improving in several days, sooner if new or worsening symptoms develop.      Viral URI    Cough, unspecified type  -     SARS Coronavirus 2 Antigen, POCT Manual Read  -     POCT Influenza A/B Molecular        Please note: This chart was completed via voice to text dictation. It may contain typographical/word recognition errors. If there are any questions, please contact the provider for final clarification.

## 2024-12-27 ENCOUNTER — OFFICE VISIT (OUTPATIENT)
Dept: URGENT CARE | Facility: CLINIC | Age: 29
End: 2024-12-27

## 2024-12-27 VITALS
DIASTOLIC BLOOD PRESSURE: 81 MMHG | TEMPERATURE: 98 F | OXYGEN SATURATION: 97 % | SYSTOLIC BLOOD PRESSURE: 115 MMHG | HEIGHT: 62 IN | WEIGHT: 159.63 LBS | RESPIRATION RATE: 18 BRPM | HEART RATE: 75 BPM | BODY MASS INDEX: 29.38 KG/M2

## 2024-12-27 DIAGNOSIS — J06.9 UPPER RESPIRATORY TRACT INFECTION, UNSPECIFIED TYPE: Primary | ICD-10-CM

## 2024-12-27 LAB
CTP QC/QA: YES
MOLECULAR STREP A: NEGATIVE
POC MOLECULAR INFLUENZA A AGN: NEGATIVE
POC MOLECULAR INFLUENZA B AGN: NEGATIVE
SARS-COV-2 AG RESP QL IA.RAPID: NEGATIVE

## 2024-12-27 PROCEDURE — 99214 OFFICE O/P EST MOD 30 MIN: CPT | Mod: PBBFAC | Performed by: FAMILY MEDICINE

## 2024-12-27 NOTE — LETTER
December 27, 2024      [facility]  [facility] [address]  Phone: [phone]       Patient: Quinten Freitas   YOB: 1995  Date of Visit: 12/27/2024    To Whom It May Concern:    Yamilex Freitas  was at [facility] on 12/27/2024. The patient may return to work/school on 12/30/2024 with no restrictions. If you have any questions or concerns, or if I can be of further assistance, please do not hesitate to contact me.    Sincerely,    Valerie Patel MA

## 2024-12-27 NOTE — PROGRESS NOTES
"Subjective:       Patient ID: Quinten Freitas is a 29 y.o. female.    Vitals:  height is 5' 2" (1.575 m) and weight is 72.4 kg (159 lb 9.8 oz). Her temperature is 97.6 °F (36.4 °C). Her blood pressure is 115/81 and her pulse is 75. Her respiration is 18 and oxygen saturation is 97%.     Chief Complaint: URI (Runny nose, body aches, cough, sore throat, ear aches x 3 days. )    Patient with several days of myalgias, headache with no neuro symptoms, scratchy throat, bilateral ear pain.  Clear rhinorrhea.  No fever.  Child with similar.    All other systems are negative    Chart reviewed    Objective:   Physical Exam   Constitutional: She appears well-developed.  Non-toxic appearance. She does not appear ill. No distress.   HENT:   Head: Atraumatic.   Nose: No purulent discharge. Right sinus exhibits no maxillary sinus tenderness and no frontal sinus tenderness. Left sinus exhibits no maxillary sinus tenderness and no frontal sinus tenderness.   Mouth/Throat: Uvula is midline.   Eyes: Right eye exhibits no discharge. Left eye exhibits no discharge. Extraocular movement intact   Neck: Neck supple. No neck rigidity present.   Cardiovascular: Regular rhythm.   Pulmonary/Chest: Effort normal and breath sounds normal. No respiratory distress. She has no wheezes. She has no rhonchi. She has no rales.   Lymphadenopathy:     She has no cervical adenopathy.   Neurological: She is alert.   Skin: Skin is warm, dry and not diaphoretic.   Psychiatric: Her behavior is normal.   Nursing note and vitals reviewed.      Results for orders placed or performed in visit on 12/27/24   POCT Strep A, Molecular    Collection Time: 12/27/24  1:01 PM   Result Value Ref Range    Molecular Strep A, POC Negative Negative     Acceptable Yes    SARS Coronavirus 2 Antigen, POCT Manual Read    Collection Time: 12/27/24  1:04 PM   Result Value Ref Range    SARS Coronavirus 2 Antigen Negative Negative     Acceptable Yes "    POCT Influenza A/B Molecular    Collection Time: 12/27/24  1:06 PM   Result Value Ref Range    POC Molecular Influenza A Ag Negative Negative    POC Molecular Influenza B Ag Negative Negative     Acceptable Yes        Assessment:     1. Upper respiratory tract infection, unspecified type            Plan:     Please read patient education material and follow COVID-19 precautions.   Use over-the-counter/prescribed medications as needed for symptoms.  Return if not improving in several days, sooner if new or worsening symptoms develop.    Upper respiratory tract infection, unspecified type  -     POCT Influenza A/B Molecular  -     SARS Coronavirus 2 Antigen, POCT Manual Read  -     POCT Strep A, Molecular        Please note: This chart was completed via voice to text dictation. It may contain typographical/word recognition errors. If there are any questions, please contact the provider for final clarification.